# Patient Record
Sex: FEMALE | Race: WHITE | NOT HISPANIC OR LATINO | Employment: FULL TIME | URBAN - METROPOLITAN AREA
[De-identification: names, ages, dates, MRNs, and addresses within clinical notes are randomized per-mention and may not be internally consistent; named-entity substitution may affect disease eponyms.]

---

## 2022-03-30 ENCOUNTER — OFFICE VISIT (OUTPATIENT)
Dept: FAMILY MEDICINE | Facility: CLINIC | Age: 27
End: 2022-03-30
Payer: COMMERCIAL

## 2022-03-30 VITALS
RESPIRATION RATE: 16 BRPM | WEIGHT: 129 LBS | BODY MASS INDEX: 22.86 KG/M2 | DIASTOLIC BLOOD PRESSURE: 78 MMHG | TEMPERATURE: 97 F | HEIGHT: 63 IN | SYSTOLIC BLOOD PRESSURE: 116 MMHG | HEART RATE: 90 BPM | OXYGEN SATURATION: 100 %

## 2022-03-30 DIAGNOSIS — Z12.4 SCREENING FOR CERVICAL CANCER: ICD-10-CM

## 2022-03-30 DIAGNOSIS — E78.49 FAMILIAL HYPERLIPIDEMIA: ICD-10-CM

## 2022-03-30 DIAGNOSIS — Z30.41 ORAL CONTRACEPTIVE PILL SURVEILLANCE: ICD-10-CM

## 2022-03-30 DIAGNOSIS — E10.9 TYPE 1 DIABETES MELLITUS WITHOUT COMPLICATION (H): ICD-10-CM

## 2022-03-30 DIAGNOSIS — Z00.00 ROUTINE HISTORY AND PHYSICAL EXAMINATION OF ADULT: Primary | ICD-10-CM

## 2022-03-30 LAB
BASOPHILS # BLD AUTO: 0 10E3/UL (ref 0–0.2)
BASOPHILS NFR BLD AUTO: 0 %
EOSINOPHIL # BLD AUTO: 0.1 10E3/UL (ref 0–0.7)
EOSINOPHIL NFR BLD AUTO: 2 %
ERYTHROCYTE [DISTWIDTH] IN BLOOD BY AUTOMATED COUNT: 13 % (ref 10–15)
HBA1C MFR BLD: 6.3 % (ref 0–5.6)
HCT VFR BLD AUTO: 43.1 % (ref 35–47)
HGB BLD-MCNC: 14.9 G/DL (ref 11.7–15.7)
LYMPHOCYTES # BLD AUTO: 1.8 10E3/UL (ref 0.8–5.3)
LYMPHOCYTES NFR BLD AUTO: 26 %
MCH RBC QN AUTO: 31 PG (ref 26.5–33)
MCHC RBC AUTO-ENTMCNC: 34.6 G/DL (ref 31.5–36.5)
MCV RBC AUTO: 90 FL (ref 78–100)
MONOCYTES # BLD AUTO: 0.3 10E3/UL (ref 0–1.3)
MONOCYTES NFR BLD AUTO: 5 %
NEUTROPHILS # BLD AUTO: 4.7 10E3/UL (ref 1.6–8.3)
NEUTROPHILS NFR BLD AUTO: 67 %
PLATELET # BLD AUTO: 261 10E3/UL (ref 150–450)
RBC # BLD AUTO: 4.8 10E6/UL (ref 3.8–5.2)
WBC # BLD AUTO: 7 10E3/UL (ref 4–11)

## 2022-03-30 PROCEDURE — 36415 COLL VENOUS BLD VENIPUNCTURE: CPT | Performed by: INTERNAL MEDICINE

## 2022-03-30 PROCEDURE — 83036 HEMOGLOBIN GLYCOSYLATED A1C: CPT | Performed by: INTERNAL MEDICINE

## 2022-03-30 PROCEDURE — G0145 SCR C/V CYTO,THINLAYER,RESCR: HCPCS | Performed by: INTERNAL MEDICINE

## 2022-03-30 PROCEDURE — 99385 PREV VISIT NEW AGE 18-39: CPT | Performed by: INTERNAL MEDICINE

## 2022-03-30 PROCEDURE — 80061 LIPID PANEL: CPT | Performed by: INTERNAL MEDICINE

## 2022-03-30 PROCEDURE — 80050 GENERAL HEALTH PANEL: CPT | Performed by: INTERNAL MEDICINE

## 2022-03-30 PROCEDURE — 82043 UR ALBUMIN QUANTITATIVE: CPT | Performed by: INTERNAL MEDICINE

## 2022-03-30 RX ORDER — NORETHINDRONE ACETATE AND ETHINYL ESTRADIOL .02; 1 MG/1; MG/1
1 TABLET ORAL DAILY
Qty: 90 TABLET | Refills: 3 | Status: SHIPPED | OUTPATIENT
Start: 2022-03-30 | End: 2023-03-17

## 2022-03-30 RX ORDER — PROCHLORPERAZINE 25 MG/1
SUPPOSITORY RECTAL
COMMUNITY
Start: 2022-03-22 | End: 2022-06-14

## 2022-03-30 RX ORDER — PROCHLORPERAZINE 25 MG/1
SUPPOSITORY RECTAL
COMMUNITY
Start: 2021-12-28 | End: 2022-06-14

## 2022-03-30 RX ORDER — NORETHINDRONE ACETATE AND ETHINYL ESTRADIOL .02; 1 MG/1; MG/1
1 TABLET ORAL DAILY
COMMUNITY
Start: 2021-09-27 | End: 2022-03-30

## 2022-03-30 RX ORDER — PROCHLORPERAZINE 25 MG/1
SUPPOSITORY RECTAL
COMMUNITY
End: 2022-06-14

## 2022-03-30 ASSESSMENT — ENCOUNTER SYMPTOMS
JOINT SWELLING: 0
NERVOUS/ANXIOUS: 0
DIARRHEA: 0
CONSTIPATION: 0
WEAKNESS: 0
HEADACHES: 0
COUGH: 0
FEVER: 0
MYALGIAS: 0
PALPITATIONS: 0
PARESTHESIAS: 0
CHILLS: 0
DYSURIA: 0
ABDOMINAL PAIN: 0
BREAST MASS: 0
EYE PAIN: 0
NAUSEA: 0
SORE THROAT: 0
SHORTNESS OF BREATH: 0
DIZZINESS: 0
HEMATURIA: 0
HEMATOCHEZIA: 0
ARTHRALGIAS: 0
FREQUENCY: 0
HEARTBURN: 0

## 2022-03-30 NOTE — PROGRESS NOTES
SUBJECTIVE:   CC: Josephine Gross is an 26 year old woman who presents for preventive health visit.         Healthy Habits:     Getting at least 3 servings of Calcium per day:  Yes    Bi-annual eye exam:  Yes    Dental care twice a year:  Yes    Sleep apnea or symptoms of sleep apnea:  None    Diet:  Regular (no restrictions)    Frequency of exercise:  6-7 days/week    Duration of exercise:  45-60 minutes    Taking medications regularly:  Yes    Medication side effects:  Not applicable    PHQ-2 Total Score: 0    Additional concerns today:  No      Today's PHQ-2 Score:   PHQ-2 ( 1999 Pfizer) 3/30/2022   Q1: Little interest or pleasure in doing things 0   Q2: Feeling down, depressed or hopeless 0   PHQ-2 Score 0   Q1: Little interest or pleasure in doing things Not at all   Q2: Feeling down, depressed or hopeless Not at all   PHQ-2 Score 0       Abuse: Current or Past (Physical, Sexual or Emotional) - No  Do you feel safe in your environment? Yes    Have you ever done Advance Care Planning? (For example, a Health Directive, POLST, or a discussion with a medical provider or your loved ones about your wishes): No, advance care planning information given to patient to review.  Patient plans to discuss their wishes with loved ones or provider.      Social History     Tobacco Use     Smoking status: Never Smoker     Smokeless tobacco: Never Used   Substance Use Topics     Alcohol use: Never     If you drink alcohol do you typically have >3 drinks per day or >7 drinks per week? No    Alcohol Use 3/30/2022   Prescreen: >3 drinks/day or >7 drinks/week? Not Applicable   Prescreen: >3 drinks/day or >7 drinks/week? -         Breast CA Risk Assessment (FHS-7) 3/30/2022   Do you have a family history of breast, colon, or ovarian cancer? No / Unknown     Reviewed and updated as needed this visit by clinical staff   Tobacco   Meds  Problems  Med Hx  Surg Hx           Reviewed and updated as needed this visit by Provider           "         Former PCP: [facility]  Specialists: Sanjuana mckinney  Problem list and medications reviewed and updated. See below for additional notes.  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 Dexcom per patient averages 6.7 for anticipated a1c, reports uses 40-60 units humalog through pump      Review of Systems  10 point ROS of systems including Constitutional, Eyes, Respiratory, Cardiovascular, Gastroenterology, Genitourinary, Integumentary, Muscularskeletal, Psychiatric were all negative except for pertinent positives noted in my HPI.       OBJECTIVE:   /78 (BP Location: Right arm, Patient Position: Chair, Cuff Size: Adult Large)   Pulse 90   Temp 97  F (36.1  C) (Tympanic)   Resp 16   Ht 1.588 m (5' 2.5\")   Wt 58.5 kg (129 lb)   SpO2 100%   Breastfeeding No   BMI 23.22 kg/m    Physical Exam    GENERAL APPEARANCE: AAOx3, no distress. Well developed.    RESP: Lungs CTA bilaterally. No w/r/r. No distress     CV: RRR, S1/S2 present. No m/r/c.     ABDOMEN:  soft, nontender, no distention. No rebound or guarding.     EXT: No c/c/e in lower extremities b/l. No rashes or deformities noted.    MSK: ROM and strength intact in four extremities. No gross deformities noted.    SKIN: no suspicious lesions or rashes    NEURO: AAOx3, Motor function intact w/ 5/5 strength bilaterally to UE and LE. Sensation intact bilaterally. Speech is fluent and comprehension intact. No gait abnormalities noted.    PSYCH: appropriate mood and affect.   BREAST: bilateral exam without masses, " tenderness or nipple discharge; no palpable axillary masses or adenopathy   (female): External genitalia without masses, swelling, lesions or tenderness. Vagina is pink and moist without lesions or discharge. Cervix nontender without lesions or erosions.   Ovaries non-tender without palpable masses.        ASSESSMENT/PLAN:   Josephine was seen today for physical.    Diagnoses and all orders for this visit:    Routine history and physical examination of adult     Screening for cervical cancer  -     Pap imaged thin layer screen reflex to HPV if ASCUS - recommend age 25 - 29    Type 1 diabetes mellitus without complication (H)  Due to insurance change she will need new endo (previously at [facility])  Insulin pump status  Discussed I am unable to manage her pump for her so have placed referral for endo and diabetic educator to assist.   Did discuss reality that timeframe to get appt with specialists is quite delayed so she might need to ask her previous endo for temporary refill when needed. I have placed referrals as priority, she is appreciative  Update labs  -     CBC with Platelets & Differential; Future  -     Comprehensive metabolic panel; Future  -     Hemoglobin A1c; Future  -     Lipid panel reflex to direct LDL Fasting; Future  -     TSH with free T4 reflex; Future  -     Albumin Random Urine Quantitative with Creat Ratio; Future  -     Adult Endocrinology  Referral; Future  -     AMB Adult Diabetes Educator Referral; Future      Familial hyperlipidemia   Very high LDL on historical lipids, likely familial   Admits mother has similar cholesterol levels and had CAC score completed, no concerns per patient report.   She tries to maintain active lifestyle   She would like to avoid statin due to childbearing age at this point    Oral contraceptive pill surveillance  Patient denies personal history of blood clots. She denies migraines and is a nonsmoker. Discussed smoking can increase the chances of adverse  "events such as emboli/blood clots. She has been educated on potential benefits and risks of OCP. She is tolerating medication and would like continued refills.  -     norethindrone-ethinyl estradiol (MICROGESTIN 1/20) 1-20 MG-MCG tablet; Take 1 tablet by mouth daily    Other orders  -     REVIEW OF HEALTH MAINTENANCE PROTOCOL ORDERS    Estimated body mass index is 23.22 kg/m  as calculated from the following:    Height as of this encounter: 1.588 m (5' 2.5\").    Weight as of this encounter: 58.5 kg (129 lb).        She reports that she has never smoked. She has never used smokeless tobacco.      Counseling Resources:  ATP IV Guidelines  Pooled Cohorts Equation Calculator  Breast Cancer Risk Calculator  BRCA-Related Cancer Risk Assessment: FHS-7 Tool  FRAX Risk Assessment  ICSI Preventive Guidelines  Dietary Guidelines for Americans, 2010  USDA's MyPlate  ASA Prophylaxis  Lung CA Screening    DO BARRETT [facility]  "

## 2022-03-31 ENCOUNTER — TELEPHONE (OUTPATIENT)
Dept: FAMILY MEDICINE | Facility: CLINIC | Age: 27
End: 2022-03-31
Payer: COMMERCIAL

## 2022-03-31 LAB
ALBUMIN SERPL-MCNC: 4.1 G/DL (ref 3.4–5)
ALP SERPL-CCNC: 53 U/L (ref 40–150)
ALT SERPL W P-5'-P-CCNC: 33 U/L (ref 0–50)
ANION GAP SERPL CALCULATED.3IONS-SCNC: 4 MMOL/L (ref 3–14)
AST SERPL W P-5'-P-CCNC: 17 U/L (ref 0–45)
BILIRUB SERPL-MCNC: 0.2 MG/DL (ref 0.2–1.3)
BUN SERPL-MCNC: 16 MG/DL (ref 7–30)
CALCIUM SERPL-MCNC: 9.3 MG/DL (ref 8.5–10.1)
CHLORIDE BLD-SCNC: 105 MMOL/L (ref 94–109)
CHOLEST SERPL-MCNC: 266 MG/DL
CO2 SERPL-SCNC: 25 MMOL/L (ref 20–32)
CREAT SERPL-MCNC: 1.04 MG/DL (ref 0.52–1.04)
CREAT UR-MCNC: 137 MG/DL
FASTING STATUS PATIENT QL REPORTED: NO
GFR SERPL CREATININE-BSD FRML MDRD: 76 ML/MIN/1.73M2
GLUCOSE BLD-MCNC: 151 MG/DL (ref 70–99)
HDLC SERPL-MCNC: 46 MG/DL
LDLC SERPL CALC-MCNC: 201 MG/DL
MICROALBUMIN UR-MCNC: 5 MG/L
MICROALBUMIN/CREAT UR: 3.65 MG/G CR (ref 0–25)
NONHDLC SERPL-MCNC: 220 MG/DL
POTASSIUM BLD-SCNC: 3.8 MMOL/L (ref 3.4–5.3)
PROT SERPL-MCNC: 8 G/DL (ref 6.8–8.8)
SODIUM SERPL-SCNC: 134 MMOL/L (ref 133–144)
TRIGL SERPL-MCNC: 96 MG/DL
TSH SERPL DL<=0.005 MIU/L-ACNC: 1.26 MU/L (ref 0.4–4)

## 2022-03-31 NOTE — TELEPHONE ENCOUNTER
Diabetes Education Scheduling Outreach #1:    Call to patient to schedule. Patient declined to schedule. She wants to see endo first and already has an appointment with endo.    Yessica Bruce OnCall  Diabetes and Nutrition Scheduling

## 2022-04-01 LAB
BKR LAB AP GYN ADEQUACY: NORMAL
BKR LAB AP GYN INTERPRETATION: NORMAL
BKR LAB AP HPV REFLEX: NORMAL
BKR LAB AP PREVIOUS ABNORMAL: NORMAL
PATH REPORT.COMMENTS IMP SPEC: NORMAL
PATH REPORT.COMMENTS IMP SPEC: NORMAL
PATH REPORT.RELEVANT HX SPEC: NORMAL

## 2022-06-14 ENCOUNTER — VIRTUAL VISIT (OUTPATIENT)
Dept: ENDOCRINOLOGY | Facility: CLINIC | Age: 27
End: 2022-06-14
Attending: INTERNAL MEDICINE
Payer: COMMERCIAL

## 2022-06-14 ENCOUNTER — TELEPHONE (OUTPATIENT)
Dept: ENDOCRINOLOGY | Facility: CLINIC | Age: 27
End: 2022-06-14

## 2022-06-14 DIAGNOSIS — E10.9 TYPE 1 DIABETES MELLITUS WITHOUT COMPLICATION (H): ICD-10-CM

## 2022-06-14 PROCEDURE — 99204 OFFICE O/P NEW MOD 45 MIN: CPT | Mod: GT | Performed by: INTERNAL MEDICINE

## 2022-06-14 RX ORDER — PROCHLORPERAZINE 25 MG/1
1 SUPPOSITORY RECTAL
Qty: 1 EACH | Refills: 3 | Status: SHIPPED | OUTPATIENT
Start: 2022-06-14 | End: 2023-01-10

## 2022-06-14 RX ORDER — PROCHLORPERAZINE 25 MG/1
1 SUPPOSITORY RECTAL
Qty: 9 EACH | Refills: 3 | Status: SHIPPED | OUTPATIENT
Start: 2022-06-14 | End: 2023-01-10

## 2022-06-14 NOTE — PROGRESS NOTES
"  Video-Visit Details    Type of service:  Video Visit  Video Start Time: 11:06  Video End Time: 11:45  Originating Location (pt. Location): [place], [place]  Distant Location (provider location):  Home  Platform used for Video Visit: Raul Lin MD      Josephine Gross is a 26 year old yo female who presents today for evaluation of diabetes type 1  via a billable video visit. She also has PMHx of familial hyperlipidemia.  Chief Complaint   Patient presents with     Diabetes     Changed insurance, jobs many times so has been moving around to reestablish care. Previously with [facility].   Just bought a house last week, BG are \"terrible\" not reflective of normal blood sugars    Subjective:    1) Diabetes Mellitus    Diabetes History:  Diagnosis: , age 5-6  Hospitalizations: at diagnosis  Previous Regimens: minimed  Current Regimen: DEXCOM/Tandem (4 years ago)        Basal Correct CHO  Target   Midnight  0.75  40  10  110  3 0.8  70  8  110  730 0.7  50  6  110  8 1.25  50  5.5  110  10 1  50  7   110  12 0.55  50  7   110  3p 0.7  50  6  110  9p 1.5  50  8  110  10p 0.9  50  9  110        BG check- DEXCOM  Trends-                 Complications: none known    Last eye exam: 1 year ago, July  Foot Exam: self checks  ACEI/ARB: not indicated  Statin/ASA: not now-- planning for child bearing within next 5 years    24hr Recall: eats out more on the weekend  Breakfast-  Lunch-  Dinner-  Snacks-  Beverages-    BP Readings from Last 3 Encounters:   03/30/22 116/78       Lab Results   Component Value Date    A1C 6.3 03/30/2022       Recent Labs   Lab Test 03/30/22  1352   CHOL 266*   HDL 46*   *   TRIG 96       Lab Results   Component Value Date    MICROL 5 03/30/2022     No results found for: MICROALBUMIN      Wt Readings from Last 3 Encounters:   03/30/22 58.5 kg (129 lb)             Active diagnoses this visit:  Type 1 diabetes mellitus without complication (H)     ROS: 10 point ROS neg other than the " symptoms noted above in the HPI.      Medical, surgical, social, and family histories, medications and allergies reviewed and updated.    Objective:  Physical Exam (visual exam)  VS:  no vital signs taken for video visit  CONSTITUTIONAL: healthy, alert and NAD, responding appropriately  ENT: normocephalic, no visual evidence of trauma, normal nose and oral mucosa  EYES: conjunctivae and sclerae normal, no exophthalmos or proptosis  THYROID:  no visualized nodules or goiter  LUNGS: no audible wheeze, cough or visible cyanosis, no visible retractions or increased work of breathing  EXTREMITIES: no hand tremors  NEUROLOGY: cranial nerves grossly intact with no obvious deficit.  SKIN:  no visualized skin lesions or rash, no edema visualized  PSYCH: mentation appears normal, normal judgement        Lab Results   Component Value Date/Time    TSH 1.26 03/30/2022 01:52 PM       Last Comprehensive Metabolic Panel:  Sodium   Date Value Ref Range Status   03/30/2022 134 133 - 144 mmol/L Final     Potassium   Date Value Ref Range Status   03/30/2022 3.8 3.4 - 5.3 mmol/L Final     Chloride   Date Value Ref Range Status   03/30/2022 105 94 - 109 mmol/L Final     Carbon Dioxide (CO2)   Date Value Ref Range Status   03/30/2022 25 20 - 32 mmol/L Final     Anion Gap   Date Value Ref Range Status   03/30/2022 4 3 - 14 mmol/L Final     Glucose   Date Value Ref Range Status   03/30/2022 151 (H) 70 - 99 mg/dL Final     Urea Nitrogen   Date Value Ref Range Status   03/30/2022 16 7 - 30 mg/dL Final     Creatinine   Date Value Ref Range Status   03/30/2022 1.04 0.52 - 1.04 mg/dL Final     GFR Estimate   Date Value Ref Range Status   03/30/2022 76 >60 mL/min/1.73m2 Final     Comment:     Effective December 21, 2021 eGFRcr in adults is calculated using the 2021 CKD-EPI creatinine equation which includes age and gender (Luz Marina et al., NEJ, DOI: 10.1056/KPLApo9733203)     Calcium   Date Value Ref Range Status   03/30/2022 9.3 8.5 - 10.1 mg/dL  Final     Bilirubin Total   Date Value Ref Range Status   03/30/2022 0.2 0.2 - 1.3 mg/dL Final     Alkaline Phosphatase   Date Value Ref Range Status   03/30/2022 53 40 - 150 U/L Final     ALT   Date Value Ref Range Status   03/30/2022 33 0 - 50 U/L Final     AST   Date Value Ref Range Status   03/30/2022 17 0 - 45 U/L Final                         ASSESSMENT / PLAN:  (E10.9) Type 1 diabetes mellitus without complication (H)      DIABETES-  Recent A1c 6.3% (March 2022)  1.  Glucose Control:    Overall has bolus heavy regimen, basal reduced for hypos likely after over bolusing. For 40u TDD, would be CHO ratio of 1:12 (500/40). Will therefore plan to loosen carb ratio and raise basal at that time. Has strong geri effect, so could consider        Basal  Correct CHO  Target   Midnight  0.75  40  10  110  3 0.8  70  8  110  (eliminate this line)8 1.25  50  5.5  110  10 1  50  7-->10  110  12 0.55 -->0.7 50  7-->10  110  3p 0.7  50  6  110  9p 1.5  50  8  110  10p 0.9  50  9  110  Advised blood glucose monitoring 1-2 times/day, rotating times.   2.  Lipids:  Will follow given familial hyperlipidemia  3.  Blood Pressure:  At goal  4.  CV prevention:  Does not meet criteria for antiplatelet therapy  5.  Diabetic Nephropathy screening:  Up to date / repeat GIANA due March 2023;  6.  Diabetic Retinopathy screening:  Up to date, continue annual dilated eye exams   7.  Diabetic Neuropathy screening:  Up to date.  Instructed on routine foot care, follow-up with podiatry as needed.           No orders of the defined types were placed in this encounter.       Return to clinic 3 months    A total of 52 minutes were spent today 06/14/22 on this visit including chart review, history and counseling, documentation and other activities as detailed above.   Answers for HPI/ROS submitted by the patient on 6/14/2022  General Symptoms: No  Skin Symptoms: No  HENT Symptoms: No  EYE SYMPTOMS: No  HEART SYMPTOMS: No  LUNG SYMPTOMS:  No  INTESTINAL SYMPTOMS: No  URINARY SYMPTOMS: No  GYNECOLOGIC SYMPTOMS: No  BREAST SYMPTOMS: No  SKELETAL SYMPTOMS: No  BLOOD SYMPTOMS: No  NERVOUS SYSTEM SYMPTOMS: No  MENTAL HEALTH SYMPTOMS: No

## 2022-06-14 NOTE — LETTER
"    6/14/2022         RE: Josephine Gross  [address]  [facility] [address]        Dear Colleague,    Thank you for referring your patient, Josephine Gross, to the [facility] [place]. Please see a copy of my visit note below.      Video-Visit Details    Type of service:  Video Visit  Video Start Time: 11:06  Video End Time: 11:45  Originating Location (pt. Location): [place], [place]  Distant Location (provider location):  Home  Platform used for Video Visit: Raul Lin MD      Josephine Gross is a 26 year old yo female who presents today for evaluation of diabetes type 1  via a billable video visit. She also has PMHx of familial hyperlipidemia.  Chief Complaint   Patient presents with     Diabetes     Changed insurance, jobs many times so has been moving around to reestablish care. Previously with [facility].   Just bought a house last week, BG are \"terrible\" not reflective of normal blood sugars    Subjective:    1) Diabetes Mellitus    Diabetes History:  Diagnosis: , age 5-6  Hospitalizations: at diagnosis  Previous Regimens: minimed  Current Regimen: DEXCOM/Tandem (4 years ago)        Basal Correct CHO  Target   Midnight  0.75  40  10  110  3 0.8  70  8  110  730 0.7  50  6  110  8 1.25  50  5.5  110  10 1  50  7   110  12 0.55  50  7   110  3p 0.7  50  6  110  9p 1.5  50  8  110  10p 0.9  50  9  110        BG check- DEXCOM  Trends-                 Complications: none known    Last eye exam: 1 year ago, July  Foot Exam: self checks  ACEI/ARB: not indicated  Statin/ASA: not now-- planning for child bearing within next 5 years    24hr Recall: eats out more on the weekend  Breakfast-  Lunch-  Dinner-  Snacks-  Beverages-    BP Readings from Last 3 Encounters:   03/30/22 116/78       Lab Results   Component Value Date    A1C 6.3 03/30/2022       Recent Labs   Lab Test 03/30/22  1352   CHOL 266*   HDL 46*   *   TRIG 96       Lab Results   Component Value Date    " MICROL 5 03/30/2022     No results found for: MICROALBUMIN      Wt Readings from Last 3 Encounters:   03/30/22 58.5 kg (129 lb)             Active diagnoses this visit:  Type 1 diabetes mellitus without complication (H)     ROS: 10 point ROS neg other than the symptoms noted above in the HPI.      Medical, surgical, social, and family histories, medications and allergies reviewed and updated.    Objective:  Physical Exam (visual exam)  VS:  no vital signs taken for video visit  CONSTITUTIONAL: healthy, alert and NAD, responding appropriately  ENT: normocephalic, no visual evidence of trauma, normal nose and oral mucosa  EYES: conjunctivae and sclerae normal, no exophthalmos or proptosis  THYROID:  no visualized nodules or goiter  LUNGS: no audible wheeze, cough or visible cyanosis, no visible retractions or increased work of breathing  EXTREMITIES: no hand tremors  NEUROLOGY: cranial nerves grossly intact with no obvious deficit.  SKIN:  no visualized skin lesions or rash, no edema visualized  PSYCH: mentation appears normal, normal judgement        Lab Results   Component Value Date/Time    TSH 1.26 03/30/2022 01:52 PM       Last Comprehensive Metabolic Panel:  Sodium   Date Value Ref Range Status   03/30/2022 134 133 - 144 mmol/L Final     Potassium   Date Value Ref Range Status   03/30/2022 3.8 3.4 - 5.3 mmol/L Final     Chloride   Date Value Ref Range Status   03/30/2022 105 94 - 109 mmol/L Final     Carbon Dioxide (CO2)   Date Value Ref Range Status   03/30/2022 25 20 - 32 mmol/L Final     Anion Gap   Date Value Ref Range Status   03/30/2022 4 3 - 14 mmol/L Final     Glucose   Date Value Ref Range Status   03/30/2022 151 (H) 70 - 99 mg/dL Final     Urea Nitrogen   Date Value Ref Range Status   03/30/2022 16 7 - 30 mg/dL Final     Creatinine   Date Value Ref Range Status   03/30/2022 1.04 0.52 - 1.04 mg/dL Final     GFR Estimate   Date Value Ref Range Status   03/30/2022 76 >60 mL/min/1.73m2 Final     Comment:      Effective December 21, 2021 eGFRcr in adults is calculated using the 2021 CKD-EPI creatinine equation which includes age and gender (Luz Marina et al., NEJ, DOI: 10.1056/ABJXkr9404200)     Calcium   Date Value Ref Range Status   03/30/2022 9.3 8.5 - 10.1 mg/dL Final     Bilirubin Total   Date Value Ref Range Status   03/30/2022 0.2 0.2 - 1.3 mg/dL Final     Alkaline Phosphatase   Date Value Ref Range Status   03/30/2022 53 40 - 150 U/L Final     ALT   Date Value Ref Range Status   03/30/2022 33 0 - 50 U/L Final     AST   Date Value Ref Range Status   03/30/2022 17 0 - 45 U/L Final                         ASSESSMENT / PLAN:  (E10.9) Type 1 diabetes mellitus without complication (H)      DIABETES-  Recent A1c 6.3% (March 2022)  1.  Glucose Control:    Overall has bolus heavy regimen, basal reduced for hypos likely after over bolusing. For 40u TDD, would be CHO ratio of 1:12 (500/40). Will therefore plan to loosen carb ratio and raise basal at that time. Has strong geri effect, so could consider        Basal  Correct CHO  Target   Midnight  0.75  40  10  110  3 0.8  70  8  110  (eliminate this line)8 1.25  50  5.5  110  10 1  50  7-->10  110  12 0.55 -->0.7 50  7-->10  110  3p 0.7  50  6  110  9p 1.5  50  8  110  10p 0.9  50  9  110  Advised blood glucose monitoring 1-2 times/day, rotating times.   2.  Lipids:  Will follow given familial hyperlipidemia  3.  Blood Pressure:  At goal  4.  CV prevention:  Does not meet criteria for antiplatelet therapy  5.  Diabetic Nephropathy screening:  Up to date / repeat GIANA due March 2023;  6.  Diabetic Retinopathy screening:  Up to date, continue annual dilated eye exams   7.  Diabetic Neuropathy screening:  Up to date.  Instructed on routine foot care, follow-up with podiatry as needed.           No orders of the defined types were placed in this encounter.       Return to clinic 3 months    A total of 52 minutes were spent today 06/14/22 on this visit including chart review,  history and counseling, documentation and other activities as detailed above.   Answers for HPI/ROS submitted by the patient on 6/14/2022  General Symptoms: No  Skin Symptoms: No  HENT Symptoms: No  EYE SYMPTOMS: No  HEART SYMPTOMS: No  LUNG SYMPTOMS: No  INTESTINAL SYMPTOMS: No  URINARY SYMPTOMS: No  GYNECOLOGIC SYMPTOMS: No  BREAST SYMPTOMS: No  SKELETAL SYMPTOMS: No  BLOOD SYMPTOMS: No  NERVOUS SYSTEM SYMPTOMS: No  MENTAL HEALTH SYMPTOMS: No          Again, thank you for allowing me to participate in the care of your patient.        Sincerely,        Karina Lin MD

## 2022-06-14 NOTE — TELEPHONE ENCOUNTER
Please send over an appropriate prescription for the Humalog vials. Need a new rx sent over with the max daily units the patient can use.    Bridgette Ayala [facility]  Diabetes Weight Management Clinic Liaison     [facility]    [email or web address]  Phone: [phone]  Fax: [phone]

## 2022-07-17 ENCOUNTER — HEALTH MAINTENANCE LETTER (OUTPATIENT)
Age: 27
End: 2022-07-17

## 2022-09-24 ENCOUNTER — HEALTH MAINTENANCE LETTER (OUTPATIENT)
Age: 27
End: 2022-09-24

## 2022-10-06 ENCOUNTER — TELEPHONE (OUTPATIENT)
Dept: ENDOCRINOLOGY | Facility: CLINIC | Age: 27
End: 2022-10-06

## 2022-10-06 NOTE — TELEPHONE ENCOUNTER
[facility] Call Center    Phone Message    May a detailed message be left on voicemail: no    Reason for Call: Other: Gallito GILES called from [facility]. He states that he sent a fax on 9/30/22 regarding a pump for this patient. He was following up on this.He will be re-faxing. He wanted to bring to your attention that the patient was recently  so the last name may not match. Please follow up. Thank you.    Action Taken: Other: Endo    Travel Screening: Not Applicable

## 2022-10-12 ENCOUNTER — MEDICAL CORRESPONDENCE (OUTPATIENT)
Dept: HEALTH INFORMATION MANAGEMENT | Facility: CLINIC | Age: 27
End: 2022-10-12

## 2022-10-13 NOTE — TELEPHONE ENCOUNTER
Obed/Tandem and printed OV note  Form(s) have been completed faxed.  Faxed or mailed to: number listed on form.  Form(s) in accordion file for 1 month then to scan.    Priscilla Morales MA

## 2023-01-10 ENCOUNTER — TELEPHONE (OUTPATIENT)
Dept: ENDOCRINOLOGY | Facility: CLINIC | Age: 28
End: 2023-01-10

## 2023-01-10 ENCOUNTER — VIRTUAL VISIT (OUTPATIENT)
Dept: ENDOCRINOLOGY | Facility: CLINIC | Age: 28
End: 2023-01-10
Payer: COMMERCIAL

## 2023-01-10 DIAGNOSIS — E10.9 TYPE 1 DIABETES MELLITUS WITHOUT COMPLICATION (H): Primary | ICD-10-CM

## 2023-01-10 PROCEDURE — 99214 OFFICE O/P EST MOD 30 MIN: CPT | Mod: GT | Performed by: INTERNAL MEDICINE

## 2023-01-10 RX ORDER — PROCHLORPERAZINE 25 MG/1
1 SUPPOSITORY RECTAL
Qty: 1 EACH | Refills: 3 | Status: SHIPPED | OUTPATIENT
Start: 2023-01-10 | End: 2023-01-20

## 2023-01-10 RX ORDER — INSULIN ASPART 100 [IU]/ML
INJECTION, SOLUTION INTRAVENOUS; SUBCUTANEOUS
Qty: 60 ML | Refills: 6 | Status: SHIPPED | OUTPATIENT
Start: 2023-01-10 | End: 2023-01-20

## 2023-01-10 RX ORDER — PROCHLORPERAZINE 25 MG/1
1 SUPPOSITORY RECTAL
Qty: 9 EACH | Refills: 3 | Status: SHIPPED | OUTPATIENT
Start: 2023-01-10 | End: 2023-01-20

## 2023-01-10 NOTE — PROGRESS NOTES
Video-Visit Details    Type of service:  Video Visit  Video Start Time: 07:35  Video End Time: 8:05  Originating Location (pt. Location): [place], [place]  Distant Location (provider location):  Home  Platform used for Video Visit: Raul Lin MD      Josephine Gross is a 26 year old yo female who presents today for evaluation of diabetes type 1  via a billable video visit. She also has PMHx of familial hyperlipidemia.  Chief Complaint   Patient presents with     Diabetes     Type 1 diabetes mellitus without complication     INTERVAL HISTORY:  - Developing atrophy along stomach, but rotating site of pump  - When she eats rice, her numbers skyrocket around lunch, needs to rebolus  - Otherwise doing well, will eat evening snack  - Eye exam October, looked good    Subjective:    1) Diabetes Mellitus    Diabetes History:  Diagnosis: , age 5-6  Hospitalizations: at diagnosis  Previous Regimens: minimed  Current Regimen: DEXCOM/Tandem (4 years ago)        Basal Correct CHO  Target   Midnight 0.750 u/hr 1u:40 mg/dL 1u:10.0 g 110 mg/dL  3:00 AM 0.800 u/hr 1u:70 mg/dL 1u:8.0 g 110 mg/dL  8:00 AM 1.250 u/hr 1u:50 mg/dL 1u:5.5 g 110 mg/dL  12:00 PM 1.250 u/hr 1u:50 mg/dL 1u:7.0 g 110 mg/dL  3:00 PM 1.100 u/hr 1u:50 mg/dL 1u:6.0 g 110 mg/dL  9:00 PM 1.250 u/hr 1u:50 mg/dL 1u:8.5 g 110 mg/dL         BG check- DEXCOM  Trends-                           Complications: none known    Last eye exam: October, no NPDR  Foot Exam: self checks  ACEI/ARB: not indicated  Statin/ASA: not now-- planning for child bearing within next 5 years    24hr Recall: eats out more on the weekend  Breakfast-  Lunch-  Dinner-  Snacks-  Beverages-    BP Readings from Last 3 Encounters:   03/30/22 116/78       Lab Results   Component Value Date    A1C 6.3 03/30/2022       Recent Labs   Lab Test 03/30/22  1352   CHOL 266*   HDL 46*   *   TRIG 96       Lab Results   Component Value Date    MICROL 5 03/30/2022     No results found for:  MICROALBUMIN      Wt Readings from Last 3 Encounters:   03/30/22 58.5 kg (129 lb)             Active diagnoses this visit:  Type 1 diabetes mellitus without complication (H)     ROS: 10 point ROS neg other than the symptoms noted above in the HPI.      Medical, surgical, social, and family histories, medications and allergies reviewed and updated.    Objective:  Physical Exam (visual exam)  VS:  no vital signs taken for video visit  CONSTITUTIONAL: healthy, alert and NAD, responding appropriately  ENT: normocephalic, no visual evidence of trauma, normal nose and oral mucosa  EYES: conjunctivae and sclerae normal, no exophthalmos or proptosis  THYROID:  no visualized nodules or goiter  LUNGS: no audible wheeze, cough or visible cyanosis, no visible retractions or increased work of breathing  EXTREMITIES: no hand tremors  NEUROLOGY: cranial nerves grossly intact with no obvious deficit.  SKIN:  no visualized skin lesions or rash, no edema visualized  PSYCH: mentation appears normal, normal judgement        Lab Results   Component Value Date/Time    TSH 1.26 03/30/2022 01:52 PM       Last Comprehensive Metabolic Panel:  Sodium   Date Value Ref Range Status   03/30/2022 134 133 - 144 mmol/L Final     Potassium   Date Value Ref Range Status   03/30/2022 3.8 3.4 - 5.3 mmol/L Final     Chloride   Date Value Ref Range Status   03/30/2022 105 94 - 109 mmol/L Final     Carbon Dioxide (CO2)   Date Value Ref Range Status   03/30/2022 25 20 - 32 mmol/L Final     Anion Gap   Date Value Ref Range Status   03/30/2022 4 3 - 14 mmol/L Final     Glucose   Date Value Ref Range Status   03/30/2022 151 (H) 70 - 99 mg/dL Final     Urea Nitrogen   Date Value Ref Range Status   03/30/2022 16 7 - 30 mg/dL Final     Creatinine   Date Value Ref Range Status   03/30/2022 1.04 0.52 - 1.04 mg/dL Final     GFR Estimate   Date Value Ref Range Status   03/30/2022 76 >60 mL/min/1.73m2 Final     Comment:     Effective December 21, 2021 eGFRcr in  adults is calculated using the 2021 CKD-EPI creatinine equation which includes age and gender (Luz Marina et al., [place], DOI: 10.1056/GIXStj3140023)     Calcium   Date Value Ref Range Status   03/30/2022 9.3 8.5 - 10.1 mg/dL Final     Bilirubin Total   Date Value Ref Range Status   03/30/2022 0.2 0.2 - 1.3 mg/dL Final     Alkaline Phosphatase   Date Value Ref Range Status   03/30/2022 53 40 - 150 U/L Final     ALT   Date Value Ref Range Status   03/30/2022 33 0 - 50 U/L Final     AST   Date Value Ref Range Status   03/30/2022 17 0 - 45 U/L Final                         ASSESSMENT / PLAN:  (E10.9) Type 1 diabetes mellitus without complication (H)      DIABETES-  Recent A1c 6.3% (March 2022)  1.  Glucose Control:    Overall has bolus heavy regimen, but seems to be what she needs  Overnight rise between 2-3a  Adjust overnight basal rates up 10%  TDD approx 58.8, --> correction 1:30, will match closer to this than previous (mostly around 1:50, has not been adjusted since getting pump)  Recommended more complex carb or bolusing further in advance for rice  Will send instructions to turn on extend bolus.       Basal  Correct CHO  Target   Midnight 0.850 u/hr 1u:40 mg/dL 1u:10.0 g 110 mg/dL  2:00 AM 0.900 u/hr 1u:40 mg/dL 1u:8.0 g 110 mg/dL  8:00 AM 1.250 u/hr 1u:40 mg/dL 1u:5.5 g 110 mg/dL  12:00 PM 1.250 u/hr 1u:30 mg/dL 1u:7.0 g 110 mg/dL  3:00 PM 1.100 u/hr 1u:40 mg/dL 1u:6.0 g 110 mg/dL  9:00 PM 1.250 u/hr 1u:40 mg/dL 1u:7.5 g 110 mg/dL         Advised blood glucose monitoring 1-2 times/day, rotating times.   2.  Lipids:  Will follow given familial hyperlipidemia- check statin  3.  Blood Pressure:    4.  CV prevention:  Does not meet criteria for antiplatelet therapy  5.  Diabetic Nephropathy screening:  Up to date / repeat GIANA due March 2023;  6.  Diabetic Retinopathy screening:  Up to date, continue annual dilated eye exams -October 7.  Diabetic Neuropathy screening:  Up to date.  Instructed on routine foot care,  follow-up with podiatry as needed.           Orders Placed This Encounter   Procedures     Hemoglobin A1c     Comprehensive metabolic panel     Albumin Random Urine Quantitative with Creat Ratio     Lipid panel reflex to direct LDL Fasting        Return to clinic 6 months    A total of 39 minutes were spent today 01/10/23 on this visit including chart review, history and counseling, documentation and other activities as detailed above.       Answers for HPI/ROS submitted by the patient on 1/9/2023  General Symptoms: No  Skin Symptoms: No  HENT Symptoms: No  EYE SYMPTOMS: No  HEART SYMPTOMS: No  LUNG SYMPTOMS: No  INTESTINAL SYMPTOMS: No  URINARY SYMPTOMS: No  GYNECOLOGIC SYMPTOMS: No  BREAST SYMPTOMS: No  SKELETAL SYMPTOMS: No  BLOOD SYMPTOMS: No  NERVOUS SYSTEM SYMPTOMS: No  MENTAL HEALTH SYMPTOMS: No

## 2023-01-10 NOTE — PATIENT INSTRUCTIONS
https://support.[email or web address]/hc/en-us/articles/1500011449921-How-to-deliver-an-extended-bolus-with-your-t-slim-X2-insulin-pump-

## 2023-01-10 NOTE — LETTER
1/10/2023         RE: Josephine Gross  [address]  [facility] [address]        Dear Colleague,    Thank you for referring your patient, Josephine Gross, to the [facility] [place]. Please see a copy of my visit note below.      Video-Visit Details    Type of service:  Video Visit  Video Start Time: 07:35  Video End Time: 8:05  Originating Location (pt. Location): [place], [place]  Distant Location (provider location):  Home  Platform used for Video Visit: Raul Lin MD      Josephine Gross is a 26 year old yo female who presents today for evaluation of diabetes type 1  via a billable video visit. She also has PMHx of familial hyperlipidemia.  Chief Complaint   Patient presents with     Diabetes     Type 1 diabetes mellitus without complication     INTERVAL HISTORY:  - Developing atrophy along stomach, but rotating site of pump  - When she eats rice, her numbers skyrocket around lunch, needs to rebolus  - Otherwise doing well, will eat evening snack  - Eye exam October, looked good    Subjective:    1) Diabetes Mellitus    Diabetes History:  Diagnosis: , age 5-6  Hospitalizations: at diagnosis  Previous Regimens: minimed  Current Regimen: DEXCOM/Tandem (4 years ago)        Basal Correct CHO  Target   Midnight 0.750 u/hr 1u:40 mg/dL 1u:10.0 g 110 mg/dL  3:00 AM 0.800 u/hr 1u:70 mg/dL 1u:8.0 g 110 mg/dL  8:00 AM 1.250 u/hr 1u:50 mg/dL 1u:5.5 g 110 mg/dL  12:00 PM 1.250 u/hr 1u:50 mg/dL 1u:7.0 g 110 mg/dL  3:00 PM 1.100 u/hr 1u:50 mg/dL 1u:6.0 g 110 mg/dL  9:00 PM 1.250 u/hr 1u:50 mg/dL 1u:8.5 g 110 mg/dL         BG check- DEXCOM  Trends-                           Complications: none known    Last eye exam: October, no NPDR  Foot Exam: self checks  ACEI/ARB: not indicated  Statin/ASA: not now-- planning for child bearing within next 5 years    24hr Recall: eats out more on the weekend  Breakfast-  Lunch-  Dinner-  Snacks-  Beverages-    BP Readings from Last 3 Encounters:    03/30/22 116/78       Lab Results   Component Value Date    A1C 6.3 03/30/2022       Recent Labs   Lab Test 03/30/22  1352   CHOL 266*   HDL 46*   *   TRIG 96       Lab Results   Component Value Date    MICROL 5 03/30/2022     No results found for: MICROALBUMIN      Wt Readings from Last 3 Encounters:   03/30/22 58.5 kg (129 lb)             Active diagnoses this visit:  Type 1 diabetes mellitus without complication (H)     ROS: 10 point ROS neg other than the symptoms noted above in the HPI.      Medical, surgical, social, and family histories, medications and allergies reviewed and updated.    Objective:  Physical Exam (visual exam)  VS:  no vital signs taken for video visit  CONSTITUTIONAL: healthy, alert and NAD, responding appropriately  ENT: normocephalic, no visual evidence of trauma, normal nose and oral mucosa  EYES: conjunctivae and sclerae normal, no exophthalmos or proptosis  THYROID:  no visualized nodules or goiter  LUNGS: no audible wheeze, cough or visible cyanosis, no visible retractions or increased work of breathing  EXTREMITIES: no hand tremors  NEUROLOGY: cranial nerves grossly intact with no obvious deficit.  SKIN:  no visualized skin lesions or rash, no edema visualized  PSYCH: mentation appears normal, normal judgement        Lab Results   Component Value Date/Time    TSH 1.26 03/30/2022 01:52 PM       Last Comprehensive Metabolic Panel:  Sodium   Date Value Ref Range Status   03/30/2022 134 133 - 144 mmol/L Final     Potassium   Date Value Ref Range Status   03/30/2022 3.8 3.4 - 5.3 mmol/L Final     Chloride   Date Value Ref Range Status   03/30/2022 105 94 - 109 mmol/L Final     Carbon Dioxide (CO2)   Date Value Ref Range Status   03/30/2022 25 20 - 32 mmol/L Final     Anion Gap   Date Value Ref Range Status   03/30/2022 4 3 - 14 mmol/L Final     Glucose   Date Value Ref Range Status   03/30/2022 151 (H) 70 - 99 mg/dL Final     Urea Nitrogen   Date Value Ref Range Status    03/30/2022 16 7 - 30 mg/dL Final     Creatinine   Date Value Ref Range Status   03/30/2022 1.04 0.52 - 1.04 mg/dL Final     GFR Estimate   Date Value Ref Range Status   03/30/2022 76 >60 mL/min/1.73m2 Final     Comment:     Effective December 21, 2021 eGFRcr in adults is calculated using the 2021 CKD-EPI creatinine equation which includes age and gender (Luz Marina et al., [place], DOI: 10.1056/YGLIbe2015256)     Calcium   Date Value Ref Range Status   03/30/2022 9.3 8.5 - 10.1 mg/dL Final     Bilirubin Total   Date Value Ref Range Status   03/30/2022 0.2 0.2 - 1.3 mg/dL Final     Alkaline Phosphatase   Date Value Ref Range Status   03/30/2022 53 40 - 150 U/L Final     ALT   Date Value Ref Range Status   03/30/2022 33 0 - 50 U/L Final     AST   Date Value Ref Range Status   03/30/2022 17 0 - 45 U/L Final                         ASSESSMENT / PLAN:  (E10.9) Type 1 diabetes mellitus without complication (H)      DIABETES-  Recent A1c 6.3% (March 2022)  1.  Glucose Control:    Overall has bolus heavy regimen, but seems to be what she needs  Overnight rise between 2-3a  Adjust overnight basal rates up 10%  TDD approx 58.8, --> correction 1:30, will match closer to this than previous (mostly around 1:50, has not been adjusted since getting pump)  Recommended more complex carb or bolusing further in advance for rice  Will send instructions to turn on extend bolus.       Basal  Correct CHO  Target   Midnight 0.850 u/hr 1u:40 mg/dL 1u:10.0 g 110 mg/dL  2:00 AM 0.900 u/hr 1u:40 mg/dL 1u:8.0 g 110 mg/dL  8:00 AM 1.250 u/hr 1u:40 mg/dL 1u:5.5 g 110 mg/dL  12:00 PM 1.250 u/hr 1u:30 mg/dL 1u:7.0 g 110 mg/dL  3:00 PM 1.100 u/hr 1u:40 mg/dL 1u:6.0 g 110 mg/dL  9:00 PM 1.250 u/hr 1u:40 mg/dL 1u:7.5 g 110 mg/dL         Advised blood glucose monitoring 1-2 times/day, rotating times.   2.  Lipids:  Will follow given familial hyperlipidemia- check statin  3.  Blood Pressure:    4.  CV prevention:  Does not meet criteria for antiplatelet  therapy  5.  Diabetic Nephropathy screening:  Up to date / repeat GIANA due March 2023;  6.  Diabetic Retinopathy screening:  Up to date, continue annual dilated eye exams -October  7.  Diabetic Neuropathy screening:  Up to date.  Instructed on routine foot care, follow-up with podiatry as needed.           Orders Placed This Encounter   Procedures     Hemoglobin A1c     Comprehensive metabolic panel     Albumin Random Urine Quantitative with Creat Ratio     Lipid panel reflex to direct LDL Fasting        Return to clinic 6 months    A total of 39 minutes were spent today 01/10/23 on this visit including chart review, history and counseling, documentation and other activities as detailed above.       Answers for HPI/ROS submitted by the patient on 1/9/2023  General Symptoms: No  Skin Symptoms: No  HENT Symptoms: No  EYE SYMPTOMS: No  HEART SYMPTOMS: No  LUNG SYMPTOMS: No  INTESTINAL SYMPTOMS: No  URINARY SYMPTOMS: No  GYNECOLOGIC SYMPTOMS: No  BREAST SYMPTOMS: No  SKELETAL SYMPTOMS: No  BLOOD SYMPTOMS: No  NERVOUS SYSTEM SYMPTOMS: No  MENTAL HEALTH SYMPTOMS: No          Again, thank you for allowing me to participate in the care of your patient.        Sincerely,        Karina Lin MD

## 2023-01-11 ENCOUNTER — TELEPHONE (OUTPATIENT)
Dept: ENDOCRINOLOGY | Facility: CLINIC | Age: 28
End: 2023-01-11
Payer: COMMERCIAL

## 2023-01-11 RX ORDER — INSULIN LISPRO 100 [IU]/ML
INJECTION, SOLUTION INTRAVENOUS; SUBCUTANEOUS
Qty: 60 ML | Refills: 6 | Status: SHIPPED | OUTPATIENT
Start: 2023-01-11 | End: 2023-06-08

## 2023-01-19 ENCOUNTER — MYC MEDICAL ADVICE (OUTPATIENT)
Dept: ENDOCRINOLOGY | Facility: CLINIC | Age: 28
End: 2023-01-19
Payer: COMMERCIAL

## 2023-01-19 DIAGNOSIS — E10.9 TYPE 1 DIABETES MELLITUS WITHOUT COMPLICATION (H): ICD-10-CM

## 2023-01-20 ENCOUNTER — TELEPHONE (OUTPATIENT)
Dept: ENDOCRINOLOGY | Facility: CLINIC | Age: 28
End: 2023-01-20
Payer: COMMERCIAL

## 2023-01-20 RX ORDER — PROCHLORPERAZINE 25 MG/1
1 SUPPOSITORY RECTAL
Qty: 1 EACH | Refills: 3 | Status: SHIPPED | OUTPATIENT
Start: 2023-01-20 | End: 2023-10-02

## 2023-01-20 RX ORDER — PROCHLORPERAZINE 25 MG/1
1 SUPPOSITORY RECTAL
Qty: 9 EACH | Refills: 3 | Status: SHIPPED | OUTPATIENT
Start: 2023-01-20 | End: 2023-10-02

## 2023-01-20 RX ORDER — INSULIN ASPART 100 [IU]/ML
INJECTION, SOLUTION INTRAVENOUS; SUBCUTANEOUS
Qty: 60 ML | Refills: 6 | Status: SHIPPED | OUTPATIENT
Start: 2023-01-20 | End: 2024-05-14

## 2023-01-24 NOTE — TELEPHONE ENCOUNTER
PA Initiation    Medication: Dexcom G6 Sensor  Insurance Company: Other (see comments)Comment:  [employer]  Pharm[facility] Filling the Rx: [facility] - [place], [place] - [address]  Filling Pharmacy Phone: [phone]  Filling Pharmacy Fax: [phone]  Start Date: 1/24/2023

## 2023-01-26 NOTE — TELEPHONE ENCOUNTER
Spoke with rep at [employer]- request is still under review, turnaround time is 48-72 hours after it has been sent to clinical review.

## 2023-01-29 ENCOUNTER — HEALTH MAINTENANCE LETTER (OUTPATIENT)
Age: 28
End: 2023-01-29

## 2023-01-30 NOTE — TELEPHONE ENCOUNTER
Prior Authorization Approval    Authorization Effective Date: 1/27/2023  Authorization Expiration Date: 12/31/2123  Medication: Dexcom G6 Sensor  Approved Dose/Quantity:   Reference #: [id number]   Insurance Company: Other (see comments)Comment:  [employer]  Which Pharmacy is filling the prescription (Not needed for infusion/clinic administered): [facility] - [place], [place] - [address] [address]  Pharmacy Notified: Yes  Patient Notified: Yes

## 2023-03-17 DIAGNOSIS — Z30.41 ORAL CONTRACEPTIVE PILL SURVEILLANCE: ICD-10-CM

## 2023-03-17 RX ORDER — NORETHINDRONE ACETATE AND ETHINYL ESTRADIOL 1; 20 MG/1; UG/1
TABLET ORAL
Qty: 90 TABLET | Refills: 0 | Status: SHIPPED | OUTPATIENT
Start: 2023-03-17 | End: 2023-06-13

## 2023-04-05 ENCOUNTER — MYC MEDICAL ADVICE (OUTPATIENT)
Dept: INTERNAL MEDICINE | Facility: CLINIC | Age: 28
End: 2023-04-05
Payer: COMMERCIAL

## 2023-05-08 ENCOUNTER — HEALTH MAINTENANCE LETTER (OUTPATIENT)
Age: 28
End: 2023-05-08

## 2023-06-08 ENCOUNTER — MYC MEDICAL ADVICE (OUTPATIENT)
Dept: ENDOCRINOLOGY | Facility: CLINIC | Age: 28
End: 2023-06-08
Payer: COMMERCIAL

## 2023-06-08 ENCOUNTER — TELEPHONE (OUTPATIENT)
Dept: ENDOCRINOLOGY | Facility: CLINIC | Age: 28
End: 2023-06-08

## 2023-06-08 DIAGNOSIS — E10.9 TYPE 1 DIABETES MELLITUS WITHOUT COMPLICATION (H): ICD-10-CM

## 2023-06-08 RX ORDER — INSULIN LISPRO 100 [IU]/ML
INJECTION, SOLUTION INTRAVENOUS; SUBCUTANEOUS
Qty: 60 ML | Refills: 1 | Status: SHIPPED | OUTPATIENT
Start: 2023-06-08 | End: 2024-05-14

## 2023-09-13 ENCOUNTER — LAB (OUTPATIENT)
Dept: LAB | Facility: CLINIC | Age: 28
End: 2023-09-13
Payer: COMMERCIAL

## 2023-09-13 DIAGNOSIS — E10.9 TYPE 1 DIABETES MELLITUS WITHOUT COMPLICATION (H): ICD-10-CM

## 2023-09-13 LAB
ALBUMIN SERPL BCG-MCNC: 4.3 G/DL (ref 3.5–5.2)
ALP SERPL-CCNC: 58 U/L (ref 35–104)
ALT SERPL W P-5'-P-CCNC: 19 U/L (ref 0–50)
ANION GAP SERPL CALCULATED.3IONS-SCNC: 10 MMOL/L (ref 7–15)
AST SERPL W P-5'-P-CCNC: 24 U/L (ref 0–45)
BILIRUB SERPL-MCNC: 0.2 MG/DL
BUN SERPL-MCNC: 19.1 MG/DL (ref 6–20)
CALCIUM SERPL-MCNC: 9.6 MG/DL (ref 8.6–10)
CHLORIDE SERPL-SCNC: 104 MMOL/L (ref 98–107)
CHOLEST SERPL-MCNC: 262 MG/DL
CREAT SERPL-MCNC: 1.1 MG/DL (ref 0.51–0.95)
CREAT UR-MCNC: 183 MG/DL
DEPRECATED HCO3 PLAS-SCNC: 24 MMOL/L (ref 22–29)
EGFRCR SERPLBLD CKD-EPI 2021: 70 ML/MIN/1.73M2
GLUCOSE SERPL-MCNC: 54 MG/DL (ref 70–99)
HBA1C MFR BLD: 5.8 % (ref 0–5.6)
HDLC SERPL-MCNC: 41 MG/DL
LDLC SERPL CALC-MCNC: 201 MG/DL
MICROALBUMIN UR-MCNC: <12 MG/L
MICROALBUMIN/CREAT UR: NORMAL MG/G{CREAT}
NONHDLC SERPL-MCNC: 221 MG/DL
POTASSIUM SERPL-SCNC: 3.6 MMOL/L (ref 3.4–5.3)
PROT SERPL-MCNC: 7.4 G/DL (ref 6.4–8.3)
SODIUM SERPL-SCNC: 138 MMOL/L (ref 136–145)
TRIGL SERPL-MCNC: 100 MG/DL

## 2023-09-13 PROCEDURE — 82570 ASSAY OF URINE CREATININE: CPT

## 2023-09-13 PROCEDURE — 80053 COMPREHEN METABOLIC PANEL: CPT

## 2023-09-13 PROCEDURE — 80061 LIPID PANEL: CPT

## 2023-09-13 PROCEDURE — 83036 HEMOGLOBIN GLYCOSYLATED A1C: CPT

## 2023-09-13 PROCEDURE — 36415 COLL VENOUS BLD VENIPUNCTURE: CPT

## 2023-09-13 PROCEDURE — 82043 UR ALBUMIN QUANTITATIVE: CPT

## 2023-09-28 NOTE — PROGRESS NOTES
Outcome for 09/28/23 2:15 PM: Data obtained via [employer] website  Yamilet HerreraJUSTIN dumas      Start time: 0844  End time: 0902  Provider location: off site- home  Patient location: off site- home.    HPI:   Josephine is a very pleasant 27 yo woman here for follow up for her uncomplicated type 1 diabetes diagnosed around age 5.  She also has a history of familial hyperlipidemia.  he usually sees Dr. Lin.  Today is the first time I am meeting her.  She reports she is well and has no particular concerns today. 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  She has not been dealing with  a lot of hypoglycemia.  She is physically active and eats a nutritious diet.  She wears a Tandem pump with Control [employer] technology.      She feels like with Humalog she gets bad lipoatrophy despite rotating sites.  Novolog better.      Current pump settings (using Novolog):     A Profile Active at the time of upload  Start Time Basal Rate Correction Factor Carb Ratio Target BG  Midnight 1.100 u/hr 1u:40 mg/dL 1u:10.0 g 110 mg/dL  3:00 AM 1.200 u/hr 1u:50 mg/dL 1u:8.0 g 110 mg/dL  5:00 AM 0.850 u/hr 1u:40 mg/dL 1u:8.0 g 110 mg/dL  8:00 AM 1.250 u/hr 1u:40 mg/dL 1u:5.5 g 110 mg/dL  12:00 PM 1.250 u/hr 1u:30 mg/dL 1u:7.0 g 110 mg/dL  3:00 PM 1.000 u/hr 1u:40 mg/dL 1u:6.0 g 110 mg/dL  9:00 PM 1.250 u/hr 1u:40 mg/dL 1u:8.0 g 110 mg/dL  Calculated Total Daily Basal 26.8 units  Duration of Insulin: 5:00 hours  Carbohydrates: On  Max Bolus: 20 units     Patient wears a Dexcom G6 sensor with an overall average of 128 mg/dL (CV 33%) over the past 2 weeks. Recent glucose is as follows:         Diabetes Control:   Lab Results   Component Value Date    A1C 5.8 09/13/2023    A1C 6.3 03/30/2022     Diabetes Complications: no retinopathy, no peripheral neuropathy, no nephropathy  History of DKA: at diagnosis.  Able to Detect Hypoglycemia: yes  Usual Diabetes Care Provider: Dr. Lin, prior to that was seen at [facility].      ROS  GENERAL: no weight loss, weight gain, fevers, chills, malaise, night sweats.   HEENT: no dysphagia, diplopia, neck pain or tenderness, dry/scratchy eyes, URI, cough, sinus drainage, tinnitus, sinus pressure  CV: no chest pain, pressure, palpitations, skipped beats, LOC  LUNGS: no SOB, WILLIS, cough, sputum production, wheezing   GI: no diarrhea, constipation, abdominal pain  EXTREMITIES: no rashes, ulcers, edema  NEUROLOGY: no changes in vision, tingling or numbness in hands or feet.   MSK: no muscle aches or pains, weakness  PSYCH: mood stable    Past Medical History:   Diagnosis Date    Diabetes (H)        No past surgical history on file.    Family History   Problem Relation Age of Onset    Coronary Artery Disease Maternal Grandfather     Hyperlipidemia Maternal Grandfather     Coronary Artery Disease Paternal Grandfather     Hyperlipidemia Mother     Other Cancer Maternal Grandmother        Social History   Social Hx: .  Works as a pharmacist.      Socioeconomic History    Marital status:      Spouse name: None    Number of children: None    Years of education: None    Highest education level: None   Tobacco Use    Smoking status: Never    Smokeless tobacco: Never   Substance and Sexual Activity    Alcohol use: Never    Drug use: Never    Sexual activity: Yes     Partners: Male     Birth control/protection: Pill   Other Topics Concern    Parent/sibling w/ CABG, MI or angioplasty before 65F 55M? No       Current Outpatient Medications   Medication    Continuous Blood Gluc Sensor (DEXCOM G6 SENSOR) MISC    Continuous Blood Gluc Transmit (DEXCOM G6 TRANSMITTER) MISC    glucagon 1 MG kit    insulin aspart (NOVOLOG VIAL) 100 UNITS/ML vial    insulin lispro (HUMALOG VIAL) 100 UNIT/ML vial    norethindrone-ethinyl estradiol (JUNEL 1/20) 1-20 MG-MCG tablet     No current facility-administered medications for this visit.        No Known Allergies    Physical Exam  Wt 59 kg (130 lb)   BMI 23.40 kg/m     GENERAL: healthy, alert and no distress  RESP: no audible wheeze, cough, or visible cyanosis.  No visible retractions or increased work of breathing.  Able to speak fully in complete sentences.  PSYCH: mentation appears normal, affect normal/bright, judgement and insight intact, normal speech and appearance well-groomed    RESULTS  Lab Results   Component Value Date    A1C 5.8 (H) 09/13/2023    A1C 6.3 (H) 03/30/2022       TSH   Date Value Ref Range Status   03/30/2022 1.26 0.40 - 4.00 mU/L Final       ALT   Date Value Ref Range Status   09/13/2023 19 0 - 50 U/L Final     Comment:     Reference intervals for this test were updated on 6/12/2023 to more accurately reflect our healthy population. There may be differences in the flagging of prior results with similar values performed with this method. Interpretation of those prior results can be made in the context of the updated reference intervals.     03/30/2022 33 0 - 50 U/L Final   ]    Recent Labs   Lab Test 09/13/23  0908 03/30/22  1352   CHOL 262* 266*   HDL 41* 46*   * 201*   TRIG 100 96       Lab Results   Component Value Date     09/13/2023      Lab Results   Component Value Date    POTASSIUM 3.6 09/13/2023    POTASSIUM 3.8 03/30/2022     Lab Results   Component Value Date    CHLORIDE 104 09/13/2023    CHLORIDE 105 03/30/2022     Lab Results   Component Value Date    DEANNA 9.6 09/13/2023     Lab Results   Component Value Date    CO2 24 09/13/2023    CO2 25 03/30/2022     Lab Results   Component Value Date    BUN 19.1 09/13/2023    BUN 16 03/30/2022     Lab Results   Component Value Date    CR 1.10 09/13/2023       GFR Estimate   Date Value Ref Range Status   09/13/2023 70 >60 mL/min/1.73m2 Final   03/30/2022 76 >60 mL/min/1.73m2 Final     Comment:     Effective December 21, 2021 eGFRcr in adults is calculated using the 2021 CKD-EPI creatinine equation which includes age and gender (Luz Marina et al., NEJ, DOI: 10.1056/QIFRfy6434897)     No results  found for: GFRESTBLACK    Lab Results   Component Value Date    MICROL <12.0 09/13/2023    MICROL 5 03/30/2022     No results found for: MICROALBUMIN  No results found for: CPEPT, GADAB, ISCAB    No results found for: B12]    Most recent eye exam date: : Not Found       Assessment/Plan:     1.  Type 1 diabetes- Doing very well with excellent A1c and  low glucose variability (33% CV).  She is doing a great job pre-bolusing, which has likely helped improve her control and lower her variability.  A1c is down to 5.8% and she is having minimal hypoglycemia.  We made no changes today.  We made the following plan today (instructions given to patient):      Your glucose is under excellent control.      With hybrid closed loop pumps, it is important to bolus earlier prior to meal (15-20 mins), as delayed bolus will lead to glucose rise and automated basal increases on top of carb boluses and can cause low blood sugars afterwards.    Let me know if you need help making pump adjustments for exercise.  I often set up a much less aggressive pump profile to start an hour before exercise through the end.     Emergency issues: Here are some concerns you should contact us about.  -Vomiting: more than twice.  Please check ketones.  If positive, go to ER. Monitor glucose hourly.   -High glucose (over 300 mg/dL twice in a row) with a pump:  Twice in doubt, take it out.  Change your pump site. Check ketones.  If ketones are negative, take an insulin correction by syringe/pen and recheck glucose in 1 hour.  If glucose is not coming down, please call the clinic. If ketones are moderate or large, drink lots of water, take an insulin correction 1.5 times your usual correction by syringe/pen, and recheck ketones in 1 hour.  If ketones are still present (or you are vomiting), go to the ER.  -Hypoglycemia (low glucose):   If glucose is less than 70 mg/dL, treat with 15g carb (4 glucose tablets), recheck glucose in 15 minutes.  If low again,  repeat.   If glucose is less than 54 mg/dL, treat with 30g carb, recheck glucose in 15 minutes.  If low again, repeat.  Keep glucagon in your home in case of severe hypoglycemia and train someone how to use this.    Emergency kit (please ensure you always have these with you):   Glucose tablets  Glucagon  Insulin  Syringes/needles   Extra infusion set (if on a pump)  Ketone strips    Backup insulin plan (in case of pump failure):   If your insulin pump fails, your body will not have any insulin available and your blood glucose levels can get dangerously high. This can result in diabetic ketoacidosis making you very sick (abdominal pain, confusion, vomiting, dehydration, positive urine ketones).    You have an active long acting basal insulin (Degludec: Tresiba / Detemir: Levemir / Glargine: Lantus / Toujeo / Semglee / Basaglar) prescription available. Please pick this up from your pharmacy in case your pump fails.  Basal insulin dose:______21______ units once per day.    Immediately after your pump fails and until you can  the long acting insulin, use short acting insulin (Aspart: Novolog/Fiasp / Lispro: Humalog / Glulisine:Apidra) injections (pen or vial and syringe) per your correction scale every 4 hours and continue to cover carbohydrates. You can stop this 4 hourly correction after you give yourself the basal insulin dose.    You should also administer short acting insulin subcutaneously to cover carbohydrates and per your correction scale prior to meals.     Contact us for help transitioning back to your pump when this is available.    Contact information:   If you have concerns, please send me a Stupeflix message or call the clinic at [phone].  For more urgent concerns, please call [phone] after hours/weekends and ask to speak with the endocrinologist on call.      Please let me know if you are having low blood sugars less than 70 or over 350 mg/dL.  Do not wait until your next appointment if  this is happening.      2.  Risk factors-     Retinopathy:  No.  Had eye exam within last year.  Will schedule.   Nephropathy:  BP well controlled. No microalbuminuria.  Creatinine stable.   Neuropathy: No.    Feet: OK, no ulcers.   Lipids:  LDL is high due to familial hypercholesterolemia.  Statin not indicated.   Thyroid screening: normal 3/2022.  Will recheck.   Celiac screening: will screen antibodies.  Asymptomatic.   Contraception: Junel birth control. Not considering a pregnancy in the near future.  Briefly discussed tighter targets and closer follow up prior to conception.  When she is ready, would recommend meeting with [facility] for pre-conception counseling.     3.  F/U in 3-6 mos with myself or Dr. Lin, sooner with concerns/hypoglycemia.     38 minutes spent on the date of the encounter doing chart review, review of test results, review of continuous glucose sensor, insulin pump data, interpretation of glucose data, patient visit and documentation, counseling/coordination of care, and discussion of follow up plan for worsening hyper and hypoglycemia.  The patient understood and is satisfied with today's visit.          Neva Sewell PA-C, MPAS   [facility]  Department of Medicine  Division of Endocrinology and Diabetes

## 2023-10-02 ENCOUNTER — VIRTUAL VISIT (OUTPATIENT)
Dept: ENDOCRINOLOGY | Facility: CLINIC | Age: 28
End: 2023-10-02
Payer: COMMERCIAL

## 2023-10-02 VITALS — BODY MASS INDEX: 23.4 KG/M2 | WEIGHT: 130 LBS

## 2023-10-02 DIAGNOSIS — E10.9 TYPE 1 DIABETES MELLITUS WITHOUT COMPLICATION (H): ICD-10-CM

## 2023-10-02 PROCEDURE — 99214 OFFICE O/P EST MOD 30 MIN: CPT | Mod: VID | Performed by: PHYSICIAN ASSISTANT

## 2023-10-02 RX ORDER — PROCHLORPERAZINE 25 MG/1
1 SUPPOSITORY RECTAL
Qty: 1 EACH | Refills: 3 | Status: SHIPPED | OUTPATIENT
Start: 2023-10-02 | End: 2024-05-14

## 2023-10-02 RX ORDER — INSULIN ASPART 100 [IU]/ML
INJECTION, SOLUTION INTRAVENOUS; SUBCUTANEOUS
Qty: 80 ML | Refills: 3 | Status: SHIPPED | OUTPATIENT
Start: 2023-10-02 | End: 2024-05-14

## 2023-10-02 RX ORDER — GLUCAGON 3 MG/1
1 POWDER NASAL PRN
Qty: 1 EACH | Refills: 1 | Status: SHIPPED | OUTPATIENT
Start: 2023-10-02

## 2023-10-02 RX ORDER — PROCHLORPERAZINE 25 MG/1
1 SUPPOSITORY RECTAL
Qty: 9 EACH | Refills: 3 | Status: SHIPPED | OUTPATIENT
Start: 2023-10-02 | End: 2024-05-14

## 2023-10-02 RX ORDER — URINE ACETONE TEST STRIPS
STRIP MISCELLANEOUS
Qty: 50 STRIP | Refills: 11 | Status: SHIPPED | OUTPATIENT
Start: 2023-10-02

## 2023-10-02 NOTE — NURSING NOTE
Is the patient currently in the state of [place]? YES    Visit mode:VIDEO    If the visit is dropped, the patient can be reconnected by: VIDEO VISIT: Text to cell phone:   Telephone Information:   Mobile [phone]       Will anyone else be joining the visit? NO  (If patient encounters technical issues they should call [phone]    How would you like to obtain your AVS? MyChart    Are changes needed to the allergy or medication list? No    Reason for visit: RECHECK and Video Visit    Kajal EASLEY

## 2023-10-02 NOTE — LETTER
10/2/2023       RE: Josephine Sood  [address]  [place] [address]     Dear Colleague,    Thank you for referring your patient, Josephine Sood, to the [facility] [place] at [facility]. Please see a copy of my visit note below.    Outcome for 09/28/23 2:15 PM: Data obtained via [employer] website  Yamilet Jj MA      Start time: 0844  End time: 0902  Provider location: off site- home  Patient location: off site- home.    HPI:   Josephine is a very pleasant 29 yo woman here for follow up for her uncomplicated type 1 diabetes diagnosed around age 5.  She also has a history of familial hyperlipidemia.  he usually sees Dr. Lin.  Today is the first time I am meeting her.  She reports she is well and has no particular concerns today. 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  She has not been dealing with  a lot of hypoglycemia.  She is physically active and eats a nutritious diet.  She wears a Tandem pump with [employer] technology.      She feels like with Humalog she gets bad lipoatrophy despite rotating sites.  Novolog better.      Current pump settings (using Novolog):     A Profile Active at the time of upload  Start Time Basal Rate Correction Factor Carb Ratio Target BG  Midnight 1.100 u/hr 1u:40 mg/dL 1u:10.0 g 110 mg/dL  3:00 AM 1.200 u/hr 1u:50 mg/dL 1u:8.0 g 110 mg/dL  5:00 AM 0.850 u/hr 1u:40 mg/dL 1u:8.0 g 110 mg/dL  8:00 AM 1.250 u/hr 1u:40 mg/dL 1u:5.5 g 110 mg/dL  12:00 PM 1.250 u/hr 1u:30 mg/dL 1u:7.0 g 110 mg/dL  3:00 PM 1.000 u/hr 1u:40 mg/dL 1u:6.0 g 110 mg/dL  9:00 PM 1.250 u/hr 1u:40 mg/dL 1u:8.0 g 110 mg/dL  Calculated Total Daily Basal 26.8 units  Duration of Insulin: 5:00 hours  Carbohydrates: On  Max Bolus: 20 units     Patient wears a Dexcom G6 sensor with an overall average of 128 mg/dL (CV 33%) over the past 2 weeks. Recent glucose is as follows:         Diabetes Control:    Lab Results   Component Value Date    A1C 5.8 09/13/2023    A1C 6.3 03/30/2022     Diabetes Complications: no retinopathy, no peripheral neuropathy, no nephropathy  History of DKA: at diagnosis.  Able to Detect Hypoglycemia: yes  Usual Diabetes Care Provider: Dr. Lin, prior to that was seen at [facility].     ROS  GENERAL: no weight loss, weight gain, fevers, chills, malaise, night sweats.   HEENT: no dysphagia, diplopia, neck pain or tenderness, dry/scratchy eyes, URI, cough, sinus drainage, tinnitus, sinus pressure  CV: no chest pain, pressure, palpitations, skipped beats, LOC  LUNGS: no SOB, WILLIS, cough, sputum production, wheezing   GI: no diarrhea, constipation, abdominal pain  EXTREMITIES: no rashes, ulcers, edema  NEUROLOGY: no changes in vision, tingling or numbness in hands or feet.   MSK: no muscle aches or pains, weakness  PSYCH: mood stable    Past Medical History:   Diagnosis Date    Diabetes (H)        No past surgical history on file.    Family History   Problem Relation Age of Onset    Coronary Artery Disease Maternal Grandfather     Hyperlipidemia Maternal Grandfather     Coronary Artery Disease Paternal Grandfather     Hyperlipidemia Mother     Other Cancer Maternal Grandmother        Social History   Social Hx: .  Works as a pharmacist.      Socioeconomic History    Marital status:      Spouse name: None    Number of children: None    Years of education: None    Highest education level: None   Tobacco Use    Smoking status: Never    Smokeless tobacco: Never   Substance and Sexual Activity    Alcohol use: Never    Drug use: Never    Sexual activity: Yes     Partners: Male     Birth control/protection: Pill   Other Topics Concern    Parent/sibling w/ CABG, MI or angioplasty before 65F 55M? No       Current Outpatient Medications   Medication    Continuous Blood Gluc Sensor (DEXCOM G6 SENSOR) MISC    Continuous Blood Gluc Transmit (DEXCOM G6 TRANSMITTER) MISC    glucagon 1 MG  kit    insulin aspart (NOVOLOG VIAL) 100 UNITS/ML vial    insulin lispro (HUMALOG VIAL) 100 UNIT/ML vial    norethindrone-ethinyl estradiol (JUNEL 1/20) 1-20 MG-MCG tablet     No current facility-administered medications for this visit.        No Known Allergies    Physical Exam  Wt 59 kg (130 lb)   BMI 23.40 kg/m    GENERAL: healthy, alert and no distress  RESP: no audible wheeze, cough, or visible cyanosis.  No visible retractions or increased work of breathing.  Able to speak fully in complete sentences.  PSYCH: mentation appears normal, affect normal/bright, judgement and insight intact, normal speech and appearance well-groomed    RESULTS  Lab Results   Component Value Date    A1C 5.8 (H) 09/13/2023    A1C 6.3 (H) 03/30/2022       TSH   Date Value Ref Range Status   03/30/2022 1.26 0.40 - 4.00 mU/L Final       ALT   Date Value Ref Range Status   09/13/2023 19 0 - 50 U/L Final     Comment:     Reference intervals for this test were updated on 6/12/2023 to more accurately reflect our healthy population. There may be differences in the flagging of prior results with similar values performed with this method. Interpretation of those prior results can be made in the context of the updated reference intervals.     03/30/2022 33 0 - 50 U/L Final   ]    Recent Labs   Lab Test 09/13/23  0908 03/30/22  1352   CHOL 262* 266*   HDL 41* 46*   * 201*   TRIG 100 96       Lab Results   Component Value Date     09/13/2023      Lab Results   Component Value Date    POTASSIUM 3.6 09/13/2023    POTASSIUM 3.8 03/30/2022     Lab Results   Component Value Date    CHLORIDE 104 09/13/2023    CHLORIDE 105 03/30/2022     Lab Results   Component Value Date    DEANNA 9.6 09/13/2023     Lab Results   Component Value Date    CO2 24 09/13/2023    CO2 25 03/30/2022     Lab Results   Component Value Date    BUN 19.1 09/13/2023    BUN 16 03/30/2022     Lab Results   Component Value Date    CR 1.10 09/13/2023       GFR Estimate   Date  Value Ref Range Status   09/13/2023 70 >60 mL/min/1.73m2 Final   03/30/2022 76 >60 mL/min/1.73m2 Final     Comment:     Effective December 21, 2021 eGFRcr in adults is calculated using the 2021 CKD-EPI creatinine equation which includes age and gender (Luz Marina daniel al., NE, DOI: 10.1056/BJOOmt1846213)     No results found for: GFRESTBLACK    Lab Results   Component Value Date    MICROL <12.0 09/13/2023    MICROL 5 03/30/2022     No results found for: MICROALBUMIN  No results found for: CPEPT, GADAB, ISCAB    No results found for: B12]    Most recent eye exam date: : Not Found       Assessment/Plan:     1.  Type 1 diabetes- Doing very well with excellent A1c and  low glucose variability (33% CV).  She is doing a great job pre-bolusing, which has likely helped improve her control and lower her variability.  A1c is down to 5.8% and she is having minimal hypoglycemia.  We made no changes today.  We made the following plan today (instructions given to patient):      Your glucose is under excellent control.      With hybrid closed loop pumps, it is important to bolus earlier prior to meal (15-20 mins), as delayed bolus will lead to glucose rise and automated basal increases on top of carb boluses and can cause low blood sugars afterwards.    Let me know if you need help making pump adjustments for exercise.  I often set up a much less aggressive pump profile to start an hour before exercise through the end.     Emergency issues: Here are some concerns you should contact us about.  -Vomiting: more than twice.  Please check ketones.  If positive, go to ER. Monitor glucose hourly.   -High glucose (over 300 mg/dL twice in a row) with a pump:  Twice in doubt, take it out.  Change your pump site. Check ketones.  If ketones are negative, take an insulin correction by syringe/pen and recheck glucose in 1 hour.  If glucose is not coming down, please call the clinic. If ketones are moderate or large, drink lots of water, take an  insulin correction 1.5 times your usual correction by syringe/pen, and recheck ketones in 1 hour.  If ketones are still present (or you are vomiting), go to the ER.  -Hypoglycemia (low glucose):   If glucose is less than 70 mg/dL, treat with 15g carb (4 glucose tablets), recheck glucose in 15 minutes.  If low again, repeat.   If glucose is less than 54 mg/dL, treat with 30g carb, recheck glucose in 15 minutes.  If low again, repeat.  Keep glucagon in your home in case of severe hypoglycemia and train someone how to use this.    Emergency kit (please ensure you always have these with you):   Glucose tablets  Glucagon  Insulin  Syringes/needles   Extra infusion set (if on a pump)  Ketone strips    Backup insulin plan (in case of pump failure):   If your insulin pump fails, your body will not have any insulin available and your blood glucose levels can get dangerously high. This can result in diabetic ketoacidosis making you very sick (abdominal pain, confusion, vomiting, dehydration, positive urine ketones).    You have an active long acting basal insulin (Degludec: Tresiba / Detemir: Levemir / Glargine: Lantus / Toujeo / Semglee / Basaglar) prescription available. Please pick this up from your pharmacy in case your pump fails.  Basal insulin dose:______21______ units once per day.    Immediately after your pump fails and until you can  the long acting insulin, use short acting insulin (Aspart: Novolog/Fiasp / Lispro: Humalog / Glulisine:Apidra) injections (pen or vial and syringe) per your correction scale every 4 hours and continue to cover carbohydrates. You can stop this 4 hourly correction after you give yourself the basal insulin dose.    You should also administer short acting insulin subcutaneously to cover carbohydrates and per your correction scale prior to meals.     Contact us for help transitioning back to your pump when this is available.    Contact information:   If you have concerns, please send  me a eZonot message or call the clinic at [phone].  For more urgent concerns, please call [phone] after hours/weekends and ask to speak with the endocrinologist on call.      Please let me know if you are having low blood sugars less than 70 or over 350 mg/dL.  Do not wait until your next appointment if this is happening.      2.  Risk factors-     Retinopathy:  No.  Had eye exam within last year.  Will schedule.   Nephropathy:  BP well controlled. No microalbuminuria.  Creatinine stable.   Neuropathy: No.    Feet: OK, no ulcers.   Lipids:  LDL is high due to familial hypercholesterolemia.  Statin not indicated.   Thyroid screening: normal 3/2022.  Will recheck.   Celiac screening: will screen antibodies.  Asymptomatic.   Contraception: Junel birth control. Not considering a pregnancy in the near future.  Briefly discussed tighter targets and closer follow up prior to conception.  When she is ready, would recommend meeting with MFM for pre-conception counseling.     3.  F/U in 3-6 mos with myself or Dr. Lin, sooner with concerns/hypoglycemia.     38 minutes spent on the date of the encounter doing chart review, review of test results, review of continuous glucose sensor, insulin pump data, interpretation of glucose data, patient visit and documentation, counseling/coordination of care, and discussion of follow up plan for worsening hyper and hypoglycemia.  The patient understood and is satisfied with today's visit.          Neva Sewell PA-C, MPAS   [facility]  Department of Medicine  Division of Endocrinology and Diabetes

## 2023-10-03 NOTE — PATIENT INSTRUCTIONS
Nice meeting you, Josephine!    Your glucose is under excellent control.      With hybrid closed loop pumps, it is important to bolus earlier prior to meal (15-20 mins), as delayed bolus will lead to glucose rise and automated basal increases on top of carb boluses and can cause low blood sugars afterwards.    Let me know if you need help making pump adjustments for exercise.  I often set up a much less aggressive pump profile to start an hour before exercise through the end.     Emergency issues: Here are some concerns you should contact us about.  -Vomiting: more than twice.  Please check ketones.  If positive, go to ER. Monitor glucose hourly.   -High glucose (over 300 mg/dL twice in a row) with a pump:  Twice in doubt, take it out.  Change your pump site. Check ketones.  If ketones are negative, take an insulin correction by syringe/pen and recheck glucose in 1 hour.  If glucose is not coming down, please call the clinic. If ketones are moderate or large, drink lots of water, take an insulin correction 1.5 times your usual correction by syringe/pen, and recheck ketones in 1 hour.  If ketones are still present (or you are vomiting), go to the ER.  -Hypoglycemia (low glucose):   If glucose is less than 70 mg/dL, treat with 15g carb (4 glucose tablets), recheck glucose in 15 minutes.  If low again, repeat.   If glucose is less than 54 mg/dL, treat with 30g carb, recheck glucose in 15 minutes.  If low again, repeat.  Keep glucagon in your home in case of severe hypoglycemia and train someone how to use this.    Emergency kit (please ensure you always have these with you):   Glucose tablets  Glucagon  Insulin  Syringes/needles   Extra infusion set (if on a pump)  Ketone strips    Backup insulin plan (in case of pump failure):   If your insulin pump fails, your body will not have any insulin available and your blood glucose levels can get dangerously high. This can result in diabetic ketoacidosis making you very sick  (abdominal pain, confusion, vomiting, dehydration, positive urine ketones).    You have an active long acting basal insulin (Degludec: Tresiba / Detemir: Levemir / Glargine: Lantus / Toujeo / Semglee / Basaglar) prescription available. Please pick this up from your pharmacy in case your pump fails.  Basal insulin dose:______21______ units once per day.    Immediately after your pump fails and until you can  the long acting insulin, use short acting insulin (Aspart: Novolog/Fiasp / Lispro: Humalog / Glulisine:Apidra) injections (pen or vial and syringe) per your correction scale every 4 hours and continue to cover carbohydrates. You can stop this 4 hourly correction after you give yourself the basal insulin dose.    You should also administer short acting insulin subcutaneously to cover carbohydrates and per your correction scale prior to meals.     Contact us for help transitioning back to your pump when this is available.    Contact information:   If you have concerns, please send me a [employer] message or call the clinic at [phone].  For more urgent concerns, please call [phone] after hours/weekends and ask to speak with the endocrinologist on call.      Please let me know if you are having low blood sugars less than 70 or over 350 mg/dL.  Do not wait until your next appointment if this is happening.

## 2023-10-06 ENCOUNTER — TELEPHONE (OUTPATIENT)
Dept: ENDOCRINOLOGY | Facility: CLINIC | Age: 28
End: 2023-10-06
Payer: COMMERCIAL

## 2023-10-06 NOTE — TELEPHONE ENCOUNTER
CHERRY and bradly message for PT to call [phone] to schedule f/u appt with Dr. Lin. There are some holds for Dec for pt to schedule f/u. ok to schedule per Vero.

## 2023-12-17 ENCOUNTER — HEALTH MAINTENANCE LETTER (OUTPATIENT)
Age: 28
End: 2023-12-17

## 2024-05-05 ENCOUNTER — HEALTH MAINTENANCE LETTER (OUTPATIENT)
Age: 29
End: 2024-05-05

## 2024-05-14 ENCOUNTER — VIRTUAL VISIT (OUTPATIENT)
Dept: ENDOCRINOLOGY | Facility: CLINIC | Age: 29
End: 2024-05-14
Payer: COMMERCIAL

## 2024-05-14 DIAGNOSIS — E10.9 TYPE 1 DIABETES MELLITUS WITHOUT COMPLICATION (H): Primary | ICD-10-CM

## 2024-05-14 PROCEDURE — 99213 OFFICE O/P EST LOW 20 MIN: CPT | Mod: 95 | Performed by: INTERNAL MEDICINE

## 2024-05-14 RX ORDER — PROCHLORPERAZINE 25 MG/1
1 SUPPOSITORY RECTAL
Qty: 1 EACH | Refills: 3 | Status: SHIPPED | OUTPATIENT
Start: 2024-05-14

## 2024-05-14 RX ORDER — ACYCLOVIR 400 MG/1
TABLET ORAL
Qty: 9 EACH | Refills: 3 | Status: SHIPPED | OUTPATIENT
Start: 2024-05-14

## 2024-05-14 RX ORDER — INSULIN ASPART 100 [IU]/ML
INJECTION, SOLUTION INTRAVENOUS; SUBCUTANEOUS
Qty: 80 ML | Refills: 3 | Status: SHIPPED | OUTPATIENT
Start: 2024-05-14

## 2024-05-14 RX ORDER — PROCHLORPERAZINE 25 MG/1
1 SUPPOSITORY RECTAL
Qty: 9 EACH | Refills: 3 | Status: SHIPPED | OUTPATIENT
Start: 2024-05-14

## 2024-05-14 NOTE — LETTER
5/14/2024         RE: Josephine Sood  [address]  [facility] [address]        Dear Colleague,    Thank you for referring your patient, Josephine Sood, to the [facility] [place]. Please see a copy of my visit note below.      Video-Visit Details    Type of service:  Video Visit  Video Start Time: 3:11  Video End Time:3:30  Originating Location (pt. Location): [place], [place]  Distant Location (provider location):  Home  Platform used for Video Visit: Raul Lin MD    Josephine Sood is a 28 year old year old female here for evaluation of type 1 diabetes mellitus via a billable video visit.      HPI:   1) Diabetes Mellitus     Diabetes History:  Diagnosis: , age 5-6  Hospitalizations: at diagnosis  Previous Regimens: minimed  Current Regimen: DEXCOM/Tandem (4 years ago)    She feels like with Humalog she gets bad lipoatrophy despite rotating sites.  Novolog better.      Current pump settings (using Novolog):              Complications: none known     Last eye exam: October, no NPDR  Foot Exam: self checks  ACEI/ARB: not indicated  Statin/ASA: not now-- planning for child bearing within next 5 years      Diabetes Control:   Lab Results   Component Value Date    A1C 5.8 09/13/2023    A1C 6.3 03/30/2022     Diabetes Complications: no retinopathy, no peripheral neuropathy, no nephropathy  History of DKA: at diagnosis.  Able to Detect Hypoglycemia: yes  Usual Diabetes Care Provider:     ENEDELIA  GENERAL: no weight loss, weight gain, fevers, chills, malaise, night sweats.   HEENT: no dysphagia, diplopia, neck pain or tenderness, dry/scratchy eyes, URI, cough, sinus drainage, tinnitus, sinus pressure  CV: no chest pain, pressure, palpitations, skipped beats, LOC  LUNGS: no SOB, WILLIS, cough, sputum production, wheezing   GI: no diarrhea, constipation, abdominal pain  EXTREMITIES: no rashes, ulcers, edema  NEUROLOGY: no changes in vision, tingling or numbness in hands or feet.    MSK: no muscle aches or pains, weakness  PSYCH: mood stable    Past Medical History:   Diagnosis Date     Diabetes (H)        No past surgical history on file.    Family History   Problem Relation Age of Onset     Coronary Artery Disease Maternal Grandfather      Hyperlipidemia Maternal Grandfather      Coronary Artery Disease Paternal Grandfather      Hyperlipidemia Mother      Other Cancer Maternal Grandmother        Social History   Social Hx: .  Works as a pharmacist.      Socioeconomic History     Marital status:      Spouse name: None     Number of children: None     Years of education: None     Highest education level: None   Tobacco Use     Smoking status: Never     Smokeless tobacco: Never   Substance and Sexual Activity     Alcohol use: Never     Drug use: Never     Sexual activity: Yes     Partners: Male     Birth control/protection: Pill   Other Topics Concern     Parent/sibling w/ CABG, MI or angioplasty before 65F 55M? No       Current Outpatient Medications   Medication Sig Dispense Refill     Continuous Blood Gluc Sensor (DEXCOM G6 SENSOR) MISC Inject 1 each Subcutaneous every 10 days Change every 10 days. 9 each 3     Continuous Blood Gluc Transmit (DEXCOM G6 TRANSMITTER) MISC Inject 1 each Subcutaneous every 3 months Change every 3 months. 1 each 3     Glucagon (BAQSIMI TWO PACK) 3 MG/DOSE nasal powder Spray 1 spray (3 mg) in nostril as needed in the event of unconscious hypoglycemia or hypoglycemic seizure. May repeat dose if no response after 15 minutes. 1 each 1     glucagon 1 MG kit Inject 1 mg into the muscle       insulin aspart (NOVOLOG VIAL) 100 UNITS/ML vial Use as directed up to 80 units daily. 80 mL 3     insulin aspart (NOVOLOG VIAL) 100 UNITS/ML vial Use with insulin pump. Max daily dose 80u 60 mL 6     insulin glargine (LANTUS PEN) 100 UNIT/ML pen Take 21 units daily in case of pump failure. 15 mL 11     insulin lispro (HUMALOG VIAL) 100 UNIT/ML vial Use with insulin  pump. Max daily dose 80u. 60 mL 1     KETOSTIX test strip Use as directed in case of high glucose, vomiting or illness. 50 strip 11     norethindrone-ethinyl estradiol (JUNEL 1/20) 1-20 MG-MCG tablet Take 1 tablet by mouth daily 90 tablet 0     No current facility-administered medications for this visit.        No Known Allergies    Physical Exam  There were no vitals taken for this visit.  Physical Exam (visual exam)  VS:  no vital signs taken for video visit  CONSTITUTIONAL: healthy, alert and NAD, responding appropriately  ENT: normocephalic, no visual evidence of trauma, normal nose and oral mucosa  EYES: conjunctivae and sclerae normal, no exophthalmos or proptosis  THYROID:  no visualized nodules or goiter  LUNGS: no audible wheeze, cough or visible cyanosis, no visible retractions or increased work of breathing  EXTREMITIES: no hand tremors  NEUROLOGY: cranial nerves grossly intact with no obvious deficit.  SKIN:  no visualized skin lesions or rash, no edema visualized  PSYCH: mentation appears normal, normal judgement      RESULTS  Lab Results   Component Value Date    A1C 5.8 (H) 09/13/2023    A1C 6.3 (H) 03/30/2022       TSH   Date Value Ref Range Status   03/30/2022 1.26 0.40 - 4.00 mU/L Final       ALT   Date Value Ref Range Status   09/13/2023 19 0 - 50 U/L Final     Comment:     Reference intervals for this test were updated on 6/12/2023 to more accurately reflect our healthy population. There may be differences in the flagging of prior results with similar values performed with this method. Interpretation of those prior results can be made in the context of the updated reference intervals.     03/30/2022 33 0 - 50 U/L Final   ]    Recent Labs   Lab Test 09/13/23  0908 03/30/22  1352   CHOL 262* 266*   HDL 41* 46*   * 201*   TRIG 100 96       Lab Results   Component Value Date     09/13/2023      Lab Results   Component Value Date    POTASSIUM 3.6 09/13/2023    POTASSIUM 3.8 03/30/2022  "    Lab Results   Component Value Date    CHLORIDE 104 09/13/2023    CHLORIDE 105 03/30/2022     Lab Results   Component Value Date    DEANNA 9.6 09/13/2023     Lab Results   Component Value Date    CO2 24 09/13/2023    CO2 25 03/30/2022     Lab Results   Component Value Date    BUN 19.1 09/13/2023    BUN 16 03/30/2022     Lab Results   Component Value Date    CR 1.10 09/13/2023       GFR Estimate   Date Value Ref Range Status   09/13/2023 70 >60 mL/min/1.73m2 Final   03/30/2022 76 >60 mL/min/1.73m2 Final     Comment:     Effective December 21, 2021 eGFRcr in adults is calculated using the 2021 CKD-EPI creatinine equation which includes age and gender (Luz Marina et al., NEJ, DOI: 10.1056/APYGhi5956790)     No results found for: \"GFRESTBLACK\"    Lab Results   Component Value Date    MICROL <12.0 09/13/2023    MICROL 5 03/30/2022     No results found for: MICROALBUMIN  No results found for: \"CPEPT\", \"GADAB\", \"ISCAB\"    No results found for: \"B12\"]    Most recent eye exam date: : Not Found       Assessment/Plan:     1.  Type 1 diabetes- excellent-- good TIR and low variability  - Continue current settings  - Update to G7 when able (pays cash gor G6 so wants to delay right now), RX ready/available when you want to upgrade      Emergency issues: Here are some concerns you should contact us about.  -Vomiting: more than twice.  Please check ketones.  If positive, go to ER. Monitor glucose hourly.   -High glucose (over 300 mg/dL twice in a row) with a pump:  Twice in doubt, take it out.  Change your pump site. Check ketones.  If ketones are negative, take an insulin correction by syringe/pen and recheck glucose in 1 hour.  If glucose is not coming down, please call the clinic. If ketones are moderate or large, drink lots of water, take an insulin correction 1.5 times your usual correction by syringe/pen, and recheck ketones in 1 hour.  If ketones are still present (or you are vomiting), go to the ER.  -Hypoglycemia (low glucose): "   If glucose is less than 70 mg/dL, treat with 15g carb (4 glucose tablets), recheck glucose in 15 minutes.  If low again, repeat.   If glucose is less than 54 mg/dL, treat with 30g carb, recheck glucose in 15 minutes.  If low again, repeat.  Keep glucagon in your home in case of severe hypoglycemia and train someone how to use this.    Emergency kit (please ensure you always have these with you):   Glucose tablets  Glucagon  Insulin  Syringes/needles   Extra infusion set (if on a pump)  Ketone strips    Backup insulin plan (in case of pump failure):   If your insulin pump fails, your body will not have any insulin available and your blood glucose levels can get dangerously high. This can result in diabetic ketoacidosis making you very sick (abdominal pain, confusion, vomiting, dehydration, positive urine ketones).    You have an active long acting basal insulin (Degludec: Tresiba / Detemir: Levemir / Glargine: Lantus / Toujeo / Semglee / Basaglar) prescription available. Please pick this up from your pharmacy in case your pump fails.  Basal insulin dose:______21______ units once per day.    Immediately after your pump fails and until you can  the long acting insulin, use short acting insulin (Aspart: Novolog/Fiasp / Lispro: Humalog / Glulisine:Apidra) injections (pen or vial and syringe) per your correction scale every 4 hours and continue to cover carbohydrates. You can stop this 4 hourly correction after you give yourself the basal insulin dose.    You should also administer short acting insulin subcutaneously to cover carbohydrates and per your correction scale prior to meals.     Contact us for help transitioning back to your pump when this is available.    Contact information:   If you have concerns, please send me a [employer] message or call the clinic at [phone].  For more urgent concerns, please call [phone] after hours/weekends and ask to speak with the endocrinologist on call.      Please  let me know if you are having low blood sugars less than 70 or over 350 mg/dL.  Do not wait until your next appointment if this is happening.      2.  Risk factors-     Retinopathy:  No.  Had eye exam within last year.  August 2024  Nephropathy:  BP well controlled. No microalbuminuria.  Creatinine stable.   Neuropathy: No.    Feet: OK, no ulcers.   Lipids:  LDL is high due to familial hypercholesterolemia.  Statin not indicated.   Thyroid screening: normal 3/2022.  Will recheck.   Celiac screening: will screen antibodies.  Asymptomatic.   Contraception: Junel birth control. Not considering a pregnancy in the near future.  Briefly discussed tighter targets and closer follow up prior to conception.  When she is ready, would recommend meeting with MFM for pre-conception counseling.     3.  F/unit(s) 6 months with Neva Sewell    A total of 21 minutes were spent today 05/14/24 on this visit including chart review, history and counseling, documentation and other activities as detailed above.       Again, thank you for allowing me to participate in the care of your patient.        Sincerely,        Karina Lin MD

## 2024-05-14 NOTE — PROGRESS NOTES
Video-Visit Details    Type of service:  Video Visit  Video Start Time: 3:11  Video End Time:3:30  Originating Location (pt. Location): [place], [place]  Distant Location (provider location):  Home  Platform used for Video Visit: Raul Lin MD    Josephine Sood is a 28 year old year old female here for evaluation of type 1 diabetes mellitus via a billable video visit.      HPI:   1) Diabetes Mellitus     Diabetes History:  Diagnosis: , age 5-6  Hospitalizations: at diagnosis  Previous Regimens: minimed  Current Regimen: DEXCOM/Tandem (4 years ago)    She feels like with Humalog she gets bad lipoatrophy despite rotating sites.  Novolog better.      Current pump settings (using Novolog):              Complications: none known     Last eye exam: October, no NPDR  Foot Exam: self checks  ACEI/ARB: not indicated  Statin/ASA: not now-- planning for child bearing within next 5 years      Diabetes Control:   Lab Results   Component Value Date    A1C 5.8 09/13/2023    A1C 6.3 03/30/2022     Diabetes Complications: no retinopathy, no peripheral neuropathy, no nephropathy  History of DKA: at diagnosis.  Able to Detect Hypoglycemia: yes  Usual Diabetes Care Provider:     ENEDELAI  GENERAL: no weight loss, weight gain, fevers, chills, malaise, night sweats.   HEENT: no dysphagia, diplopia, neck pain or tenderness, dry/scratchy eyes, URI, cough, sinus drainage, tinnitus, sinus pressure  CV: no chest pain, pressure, palpitations, skipped beats, LOC  LUNGS: no SOB, WILLIS, cough, sputum production, wheezing   GI: no diarrhea, constipation, abdominal pain  EXTREMITIES: no rashes, ulcers, edema  NEUROLOGY: no changes in vision, tingling or numbness in hands or feet.   MSK: no muscle aches or pains, weakness  PSYCH: mood stable    Past Medical History:   Diagnosis Date    Diabetes (H)        No past surgical history on file.    Family History   Problem Relation Age of Onset    Coronary Artery Disease Maternal Grandfather      Hyperlipidemia Maternal Grandfather     Coronary Artery Disease Paternal Grandfather     Hyperlipidemia Mother     Other Cancer Maternal Grandmother        Social History   Social Hx: .  Works as a pharmacist.      Socioeconomic History    Marital status:      Spouse name: None    Number of children: None    Years of education: None    Highest education level: None   Tobacco Use    Smoking status: Never    Smokeless tobacco: Never   Substance and Sexual Activity    Alcohol use: Never    Drug use: Never    Sexual activity: Yes     Partners: Male     Birth control/protection: Pill   Other Topics Concern    Parent/sibling w/ CABG, MI or angioplasty before 65F 55M? No       Current Outpatient Medications   Medication Sig Dispense Refill    Continuous Blood Gluc Sensor (DEXCOM G6 SENSOR) MISC Inject 1 each Subcutaneous every 10 days Change every 10 days. 9 each 3    Continuous Blood Gluc Transmit (DEXCOM G6 TRANSMITTER) MISC Inject 1 each Subcutaneous every 3 months Change every 3 months. 1 each 3    Glucagon (BAQSIMI TWO PACK) 3 MG/DOSE nasal powder Spray 1 spray (3 mg) in nostril as needed in the event of unconscious hypoglycemia or hypoglycemic seizure. May repeat dose if no response after 15 minutes. 1 each 1    glucagon 1 MG kit Inject 1 mg into the muscle      insulin aspart (NOVOLOG VIAL) 100 UNITS/ML vial Use as directed up to 80 units daily. 80 mL 3    insulin aspart (NOVOLOG VIAL) 100 UNITS/ML vial Use with insulin pump. Max daily dose 80u 60 mL 6    insulin glargine (LANTUS PEN) 100 UNIT/ML pen Take 21 units daily in case of pump failure. 15 mL 11    insulin lispro (HUMALOG VIAL) 100 UNIT/ML vial Use with insulin pump. Max daily dose 80u. 60 mL 1    KETOSTIX test strip Use as directed in case of high glucose, vomiting or illness. 50 strip 11    norethindrone-ethinyl estradiol (JUNEL 1/20) 1-20 MG-MCG tablet Take 1 tablet by mouth daily 90 tablet 0     No current facility-administered  medications for this visit.        No Known Allergies    Physical Exam  There were no vitals taken for this visit.  Physical Exam (visual exam)  VS:  no vital signs taken for video visit  CONSTITUTIONAL: healthy, alert and NAD, responding appropriately  ENT: normocephalic, no visual evidence of trauma, normal nose and oral mucosa  EYES: conjunctivae and sclerae normal, no exophthalmos or proptosis  THYROID:  no visualized nodules or goiter  LUNGS: no audible wheeze, cough or visible cyanosis, no visible retractions or increased work of breathing  EXTREMITIES: no hand tremors  NEUROLOGY: cranial nerves grossly intact with no obvious deficit.  SKIN:  no visualized skin lesions or rash, no edema visualized  PSYCH: mentation appears normal, normal judgement      RESULTS  Lab Results   Component Value Date    A1C 5.8 (H) 09/13/2023    A1C 6.3 (H) 03/30/2022       TSH   Date Value Ref Range Status   03/30/2022 1.26 0.40 - 4.00 mU/L Final       ALT   Date Value Ref Range Status   09/13/2023 19 0 - 50 U/L Final     Comment:     Reference intervals for this test were updated on 6/12/2023 to more accurately reflect our healthy population. There may be differences in the flagging of prior results with similar values performed with this method. Interpretation of those prior results can be made in the context of the updated reference intervals.     03/30/2022 33 0 - 50 U/L Final   ]    Recent Labs   Lab Test 09/13/23  0908 03/30/22  1352   CHOL 262* 266*   HDL 41* 46*   * 201*   TRIG 100 96       Lab Results   Component Value Date     09/13/2023      Lab Results   Component Value Date    POTASSIUM 3.6 09/13/2023    POTASSIUM 3.8 03/30/2022     Lab Results   Component Value Date    CHLORIDE 104 09/13/2023    CHLORIDE 105 03/30/2022     Lab Results   Component Value Date    DEANNA 9.6 09/13/2023     Lab Results   Component Value Date    CO2 24 09/13/2023    CO2 25 03/30/2022     Lab Results   Component Value Date    BUN  "19.1 09/13/2023    BUN 16 03/30/2022     Lab Results   Component Value Date    CR 1.10 09/13/2023       GFR Estimate   Date Value Ref Range Status   09/13/2023 70 >60 mL/min/1.73m2 Final   03/30/2022 76 >60 mL/min/1.73m2 Final     Comment:     Effective December 21, 2021 eGFRcr in adults is calculated using the 2021 CKD-EPI creatinine equation which includes age and gender (Luz Marina et al., [place], DOI: 10.1056/RDCJxu3029454)     No results found for: \"GFRESTBLACK\"    Lab Results   Component Value Date    MICROL <12.0 09/13/2023    MICROL 5 03/30/2022     No results found for: MICROALBUMIN  No results found for: \"CPEPT\", \"GADAB\", \"ISCAB\"    No results found for: \"B12\"]    Most recent eye exam date: : Not Found       Assessment/Plan:     1.  Type 1 diabetes- excellent-- good TIR and low variability  - Continue current settings  - Update to G7 when able (pays cash gor G6 so wants to delay right now), RX ready/available when you want to upgrade      Emergency issues: Here are some concerns you should contact us about.  -Vomiting: more than twice.  Please check ketones.  If positive, go to ER. Monitor glucose hourly.   -High glucose (over 300 mg/dL twice in a row) with a pump:  Twice in doubt, take it out.  Change your pump site. Check ketones.  If ketones are negative, take an insulin correction by syringe/pen and recheck glucose in 1 hour.  If glucose is not coming down, please call the clinic. If ketones are moderate or large, drink lots of water, take an insulin correction 1.5 times your usual correction by syringe/pen, and recheck ketones in 1 hour.  If ketones are still present (or you are vomiting), go to the ER.  -Hypoglycemia (low glucose):   If glucose is less than 70 mg/dL, treat with 15g carb (4 glucose tablets), recheck glucose in 15 minutes.  If low again, repeat.   If glucose is less than 54 mg/dL, treat with 30g carb, recheck glucose in 15 minutes.  If low again, repeat.  Keep glucagon in your home in case of " severe hypoglycemia and train someone how to use this.    Emergency kit (please ensure you always have these with you):   Glucose tablets  Glucagon  Insulin  Syringes/needles   Extra infusion set (if on a pump)  Ketone strips    Backup insulin plan (in case of pump failure):   If your insulin pump fails, your body will not have any insulin available and your blood glucose levels can get dangerously high. This can result in diabetic ketoacidosis making you very sick (abdominal pain, confusion, vomiting, dehydration, positive urine ketones).    You have an active long acting basal insulin (Degludec: Tresiba / Detemir: Levemir / Glargine: Lantus / Toujeo / Semglee / Basaglar) prescription available. Please pick this up from your pharmacy in case your pump fails.  Basal insulin dose:______21______ units once per day.    Immediately after your pump fails and until you can  the long acting insulin, use short acting insulin (Aspart: Novolog/Fiasp / Lispro: Humalog / Glulisine:Apidra) injections (pen or vial and syringe) per your correction scale every 4 hours and continue to cover carbohydrates. You can stop this 4 hourly correction after you give yourself the basal insulin dose.    You should also administer short acting insulin subcutaneously to cover carbohydrates and per your correction scale prior to meals.     Contact us for help transitioning back to your pump when this is available.    Contact information:   If you have concerns, please send me a [facility] message or call the clinic at [phone].  For more urgent concerns, please call [phone] after hours/weekends and ask to speak with the endocrinologist on call.      Please let me know if you are having low blood sugars less than 70 or over 350 mg/dL.  Do not wait until your next appointment if this is happening.      2.  Risk factors-     Retinopathy:  No.  Had eye exam within last year.  August 2024  Nephropathy:  BP well controlled. No  microalbuminuria.  Creatinine stable.   Neuropathy: No.    Feet: OK, no ulcers.   Lipids:  LDL is high due to familial hypercholesterolemia.  Statin not indicated.   Thyroid screening: normal 3/2022.  Will recheck.   Celiac screening: will screen antibodies.  Asymptomatic.   Contraception: Junel birth control. Not considering a pregnancy in the near future.  Briefly discussed tighter targets and closer follow up prior to conception.  When she is ready, would recommend meeting with MFM for pre-conception counseling.     3.  F/unit(s) 6 months with Neva Sewell    A total of 21 minutes were spent today 05/14/24 on this visit including chart review, history and counseling, documentation and other activities as detailed above.

## 2024-07-14 ENCOUNTER — HEALTH MAINTENANCE LETTER (OUTPATIENT)
Age: 29
End: 2024-07-14

## 2024-08-27 ENCOUNTER — MEDICAL CORRESPONDENCE (OUTPATIENT)
Dept: HEALTH INFORMATION MANAGEMENT | Facility: CLINIC | Age: 29
End: 2024-08-27
Payer: COMMERCIAL

## 2024-09-01 ENCOUNTER — MEDICAL CORRESPONDENCE (OUTPATIENT)
Dept: HEALTH INFORMATION MANAGEMENT | Facility: CLINIC | Age: 29
End: 2024-09-01

## 2024-09-05 ENCOUNTER — LAB (OUTPATIENT)
Dept: LAB | Facility: CLINIC | Age: 29
End: 2024-09-05
Payer: COMMERCIAL

## 2024-09-05 DIAGNOSIS — E10.9 TYPE 1 DIABETES MELLITUS WITHOUT COMPLICATION (H): ICD-10-CM

## 2024-09-05 LAB
ALBUMIN SERPL BCG-MCNC: 4.4 G/DL (ref 3.5–5.2)
ALP SERPL-CCNC: 49 U/L (ref 40–150)
ALT SERPL W P-5'-P-CCNC: 18 U/L (ref 0–50)
ANION GAP SERPL CALCULATED.3IONS-SCNC: 11 MMOL/L (ref 7–15)
AST SERPL W P-5'-P-CCNC: 24 U/L (ref 0–45)
BILIRUB SERPL-MCNC: 0.3 MG/DL
BUN SERPL-MCNC: 20.2 MG/DL (ref 6–20)
CALCIUM SERPL-MCNC: 9.5 MG/DL (ref 8.8–10.4)
CHLORIDE SERPL-SCNC: 103 MMOL/L (ref 98–107)
CHOLEST SERPL-MCNC: 286 MG/DL
CREAT SERPL-MCNC: 1.2 MG/DL (ref 0.51–0.95)
CREAT UR-MCNC: 19.8 MG/DL
EGFRCR SERPLBLD CKD-EPI 2021: 63 ML/MIN/1.73M2
FASTING STATUS PATIENT QL REPORTED: YES
FASTING STATUS PATIENT QL REPORTED: YES
GLUCOSE SERPL-MCNC: 85 MG/DL (ref 70–99)
HBA1C MFR BLD: 5.7 % (ref 0–5.6)
HCO3 SERPL-SCNC: 23 MMOL/L (ref 22–29)
HDLC SERPL-MCNC: 49 MG/DL
LDLC SERPL CALC-MCNC: 219 MG/DL
MICROALBUMIN UR-MCNC: <12 MG/L
MICROALBUMIN/CREAT UR: NORMAL MG/G{CREAT}
NONHDLC SERPL-MCNC: 237 MG/DL
POTASSIUM SERPL-SCNC: 5.1 MMOL/L (ref 3.4–5.3)
PROT SERPL-MCNC: 7.6 G/DL (ref 6.4–8.3)
SODIUM SERPL-SCNC: 137 MMOL/L (ref 135–145)
TRIGL SERPL-MCNC: 89 MG/DL
TSH SERPL DL<=0.005 MIU/L-ACNC: 2.11 UIU/ML (ref 0.3–4.2)
VIT D+METAB SERPL-MCNC: 108 NG/ML (ref 20–50)

## 2024-09-05 PROCEDURE — 80061 LIPID PANEL: CPT

## 2024-09-05 PROCEDURE — 36415 COLL VENOUS BLD VENIPUNCTURE: CPT

## 2024-09-05 PROCEDURE — 83036 HEMOGLOBIN GLYCOSYLATED A1C: CPT

## 2024-09-05 PROCEDURE — 84443 ASSAY THYROID STIM HORMONE: CPT

## 2024-09-05 PROCEDURE — 82043 UR ALBUMIN QUANTITATIVE: CPT

## 2024-09-05 PROCEDURE — 82306 VITAMIN D 25 HYDROXY: CPT

## 2024-09-05 PROCEDURE — 82570 ASSAY OF URINE CREATININE: CPT

## 2024-09-05 PROCEDURE — 80053 COMPREHEN METABOLIC PANEL: CPT

## 2024-09-05 PROCEDURE — 86364 TISS TRNSGLTMNASE EA IG CLAS: CPT

## 2024-09-06 LAB
TTG IGA SER-ACNC: <0.2 U/ML
TTG IGG SER-ACNC: <0.6 U/ML

## 2024-09-06 NOTE — RESULT ENCOUNTER NOTE
Hello -    Here are my comments about the recent results. First, if you're taking vitamin d STOP! Check in your multi vitamin, etc.     Second, your kidney function is rising slightly-- please make sure you're staying hydrated.  Third, please schedule a follow-up with myself and/or Neva to continue to check in.    Please let us know if you have any questions or concerns.    Regards,  Karina Lin MD

## 2024-10-16 ENCOUNTER — MEDICAL CORRESPONDENCE (OUTPATIENT)
Dept: HEALTH INFORMATION MANAGEMENT | Facility: CLINIC | Age: 29
End: 2024-10-16

## 2025-01-12 ENCOUNTER — HEALTH MAINTENANCE LETTER (OUTPATIENT)
Age: 30
End: 2025-01-12

## 2025-04-26 ENCOUNTER — HEALTH MAINTENANCE LETTER (OUTPATIENT)
Age: 30
End: 2025-04-26

## 2025-06-02 DIAGNOSIS — E10.9 TYPE 1 DIABETES MELLITUS WITHOUT COMPLICATION (H): ICD-10-CM

## 2025-06-03 RX ORDER — ACYCLOVIR 400 MG/1
TABLET ORAL
Qty: 3 EACH | Refills: 0 | Status: SHIPPED | OUTPATIENT
Start: 2025-06-03

## 2025-06-03 NOTE — TELEPHONE ENCOUNTER
Last Written Prescription Date:  5/14/24  Last Fill Quantity: 9,  # refills: 3   Last office visit: Visit date not found ; last virtual visit: 5/14/2024 with prescribing provider:  Dr. Lin   Future Office Visit:      Requested Prescriptions   Pending Prescriptions Disp Refills    Continuous Glucose Sensor (DEXCOM G7 SENSOR) MISC [Pharmacy Med Name: Dexcom G7 Sensor device (Patrice 3 plus sensor)]  3     Sig: Change every 10 days.       There is no refill protocol information for this order        1 MO refill sent with note to pharmacy that she is due for appt. Kamini Damian RN

## 2025-07-19 ENCOUNTER — HEALTH MAINTENANCE LETTER (OUTPATIENT)
Age: 30
End: 2025-07-19

## 2025-08-09 ENCOUNTER — HEALTH MAINTENANCE LETTER (OUTPATIENT)
Age: 30
End: 2025-08-09